# Patient Record
Sex: MALE | Race: OTHER | URBAN - METROPOLITAN AREA
[De-identification: names, ages, dates, MRNs, and addresses within clinical notes are randomized per-mention and may not be internally consistent; named-entity substitution may affect disease eponyms.]

---

## 2024-12-07 ENCOUNTER — INPATIENT (INPATIENT)
Facility: HOSPITAL | Age: 64
LOS: 8 days | Discharge: NURSING, REHAB OR LONG-TERM CARE | DRG: 897 | End: 2024-12-16
Attending: STUDENT IN AN ORGANIZED HEALTH CARE EDUCATION/TRAINING PROGRAM | Admitting: STUDENT IN AN ORGANIZED HEALTH CARE EDUCATION/TRAINING PROGRAM
Payer: COMMERCIAL

## 2024-12-07 ENCOUNTER — EMERGENCY (EMERGENCY)
Facility: HOSPITAL | Age: 64
LOS: 0 days | Discharge: ANOTHER HOSPITAL | End: 2024-12-07
Attending: STUDENT IN AN ORGANIZED HEALTH CARE EDUCATION/TRAINING PROGRAM
Payer: MEDICARE

## 2024-12-07 VITALS
HEIGHT: 69 IN | DIASTOLIC BLOOD PRESSURE: 98 MMHG | SYSTOLIC BLOOD PRESSURE: 157 MMHG | TEMPERATURE: 98 F | WEIGHT: 169.98 LBS | OXYGEN SATURATION: 97 % | RESPIRATION RATE: 19 BRPM | HEART RATE: 117 BPM

## 2024-12-07 VITALS
TEMPERATURE: 98 F | HEART RATE: 122 BPM | DIASTOLIC BLOOD PRESSURE: 102 MMHG | HEIGHT: 68 IN | RESPIRATION RATE: 22 BRPM | WEIGHT: 169.98 LBS | OXYGEN SATURATION: 99 % | SYSTOLIC BLOOD PRESSURE: 146 MMHG

## 2024-12-07 VITALS
RESPIRATION RATE: 16 BRPM | DIASTOLIC BLOOD PRESSURE: 109 MMHG | HEART RATE: 110 BPM | OXYGEN SATURATION: 96 % | TEMPERATURE: 98 F | SYSTOLIC BLOOD PRESSURE: 165 MMHG

## 2024-12-07 DIAGNOSIS — R53.1 WEAKNESS: ICD-10-CM

## 2024-12-07 DIAGNOSIS — Z95.1 PRESENCE OF AORTOCORONARY BYPASS GRAFT: ICD-10-CM

## 2024-12-07 DIAGNOSIS — E78.5 HYPERLIPIDEMIA, UNSPECIFIED: ICD-10-CM

## 2024-12-07 DIAGNOSIS — Z95.1 PRESENCE OF AORTOCORONARY BYPASS GRAFT: Chronic | ICD-10-CM

## 2024-12-07 DIAGNOSIS — S70.12XA CONTUSION OF LEFT THIGH, INITIAL ENCOUNTER: ICD-10-CM

## 2024-12-07 DIAGNOSIS — M54.2 CERVICALGIA: ICD-10-CM

## 2024-12-07 DIAGNOSIS — S30.1XXA CONTUSION OF ABDOMINAL WALL, INITIAL ENCOUNTER: ICD-10-CM

## 2024-12-07 DIAGNOSIS — I10 ESSENTIAL (PRIMARY) HYPERTENSION: ICD-10-CM

## 2024-12-07 DIAGNOSIS — F10.939 ALCOHOL USE, UNSPECIFIED WITH WITHDRAWAL, UNSPECIFIED: ICD-10-CM

## 2024-12-07 DIAGNOSIS — R00.0 TACHYCARDIA, UNSPECIFIED: ICD-10-CM

## 2024-12-07 DIAGNOSIS — R07.9 CHEST PAIN, UNSPECIFIED: ICD-10-CM

## 2024-12-07 DIAGNOSIS — R51.9 HEADACHE, UNSPECIFIED: ICD-10-CM
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
## 2024-12-07 DIAGNOSIS — I25.10 ATHEROSCLEROTIC HEART DISEASE OF NATIVE CORONARY ARTERY WITHOUT ANGINA PECTORIS: ICD-10-CM
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
## 2024-12-07 DIAGNOSIS — E11.9 TYPE 2 DIABETES MELLITUS WITHOUT COMPLICATIONS: ICD-10-CM

## 2024-12-07 LAB
ALBUMIN SERPL ELPH-MCNC: 3.1 G/DL — LOW (ref 3.3–5)
ALBUMIN SERPL ELPH-MCNC: 3.6 G/DL — SIGNIFICANT CHANGE UP (ref 3.3–5)
ALP SERPL-CCNC: 54 U/L — SIGNIFICANT CHANGE UP (ref 40–120)
ALP SERPL-CCNC: 54 U/L — SIGNIFICANT CHANGE UP (ref 40–120)
ALT FLD-CCNC: 30 U/L — SIGNIFICANT CHANGE UP (ref 10–45)
ALT FLD-CCNC: 42 U/L — SIGNIFICANT CHANGE UP (ref 12–78)
ANION GAP SERPL CALC-SCNC: 13 MMOL/L — SIGNIFICANT CHANGE UP (ref 5–17)
ANION GAP SERPL CALC-SCNC: 22 MMOL/L — HIGH (ref 5–17)
APPEARANCE UR: CLEAR — SIGNIFICANT CHANGE UP
APTT BLD: 30.7 SEC — SIGNIFICANT CHANGE UP (ref 24.5–35.6)
APTT BLD: 38.3 SEC — HIGH (ref 24.5–35.6)
AST SERPL-CCNC: 107 U/L — HIGH (ref 15–37)
AST SERPL-CCNC: 69 U/L — HIGH (ref 10–40)
BACTERIA # UR AUTO: NEGATIVE /HPF — SIGNIFICANT CHANGE UP
BASOPHILS # BLD AUTO: 0.03 K/UL — SIGNIFICANT CHANGE UP (ref 0–0.2)
BASOPHILS # BLD AUTO: 0.04 K/UL — SIGNIFICANT CHANGE UP (ref 0–0.2)
BASOPHILS NFR BLD AUTO: 0.7 % — SIGNIFICANT CHANGE UP (ref 0–2)
BASOPHILS NFR BLD AUTO: 0.8 % — SIGNIFICANT CHANGE UP (ref 0–2)
BILIRUB SERPL-MCNC: 0.9 MG/DL — SIGNIFICANT CHANGE UP (ref 0.2–1.2)
BILIRUB SERPL-MCNC: 1.1 MG/DL — SIGNIFICANT CHANGE UP (ref 0.2–1.2)
BILIRUB UR-MCNC: NEGATIVE — SIGNIFICANT CHANGE UP
BLD GP AB SCN SERPL QL: NEGATIVE — SIGNIFICANT CHANGE UP
BUN SERPL-MCNC: 8 MG/DL — SIGNIFICANT CHANGE UP (ref 7–23)
BUN SERPL-MCNC: 9 MG/DL — SIGNIFICANT CHANGE UP (ref 7–23)
CALCIUM SERPL-MCNC: 8.4 MG/DL — SIGNIFICANT CHANGE UP (ref 8.4–10.5)
CALCIUM SERPL-MCNC: 8.7 MG/DL — SIGNIFICANT CHANGE UP (ref 8.5–10.1)
CAST: 1 /LPF — SIGNIFICANT CHANGE UP (ref 0–4)
CHLORIDE SERPL-SCNC: 94 MMOL/L — LOW (ref 96–108)
CHLORIDE SERPL-SCNC: 97 MMOL/L — SIGNIFICANT CHANGE UP (ref 96–108)
CO2 SERPL-SCNC: 19 MMOL/L — LOW (ref 22–31)
CO2 SERPL-SCNC: 25 MMOL/L — SIGNIFICANT CHANGE UP (ref 22–31)
COLOR SPEC: YELLOW — SIGNIFICANT CHANGE UP
CREAT SERPL-MCNC: 0.73 MG/DL — SIGNIFICANT CHANGE UP (ref 0.5–1.3)
CREAT SERPL-MCNC: 0.91 MG/DL — SIGNIFICANT CHANGE UP (ref 0.5–1.3)
DIFF PNL FLD: ABNORMAL
EGFR: 102 ML/MIN/1.73M2 — SIGNIFICANT CHANGE UP
EGFR: 94 ML/MIN/1.73M2 — SIGNIFICANT CHANGE UP
EGFR: 94 ML/MIN/1.73M2 — SIGNIFICANT CHANGE UP
EOSINOPHIL # BLD AUTO: 0.02 K/UL — SIGNIFICANT CHANGE UP (ref 0–0.5)
EOSINOPHIL # BLD AUTO: 0.03 K/UL — SIGNIFICANT CHANGE UP (ref 0–0.5)
EOSINOPHIL NFR BLD AUTO: 0.4 % — SIGNIFICANT CHANGE UP (ref 0–6)
EOSINOPHIL NFR BLD AUTO: 0.6 % — SIGNIFICANT CHANGE UP (ref 0–6)
ETHANOL SERPL-MCNC: 78 MG/DL — HIGH (ref 0–10)
ETHANOL SERPL-MCNC: <10 MG/DL — SIGNIFICANT CHANGE UP (ref 0–10)
GAS PNL BLDV: SIGNIFICANT CHANGE UP
GLUCOSE SERPL-MCNC: 159 MG/DL — HIGH (ref 70–99)
GLUCOSE SERPL-MCNC: 92 MG/DL — SIGNIFICANT CHANGE UP (ref 70–99)
GLUCOSE UR QL: >=1000 MG/DL
HCT VFR BLD CALC: 39.3 % — SIGNIFICANT CHANGE UP (ref 39–50)
HCT VFR BLD CALC: 41.7 % — SIGNIFICANT CHANGE UP (ref 39–50)
HGB BLD-MCNC: 13.2 G/DL — SIGNIFICANT CHANGE UP (ref 13–17)
HGB BLD-MCNC: 14.1 G/DL — SIGNIFICANT CHANGE UP (ref 13–17)
IMM GRANULOCYTES NFR BLD AUTO: 0.4 % — SIGNIFICANT CHANGE UP (ref 0–0.9)
IMM GRANULOCYTES NFR BLD AUTO: 0.8 % — SIGNIFICANT CHANGE UP (ref 0–0.9)
INR BLD: 0.99 RATIO — SIGNIFICANT CHANGE UP (ref 0.85–1.16)
INR BLD: 1.04 RATIO — SIGNIFICANT CHANGE UP (ref 0.85–1.16)
KETONES UR-MCNC: 40 MG/DL
LACTATE SERPL-SCNC: 2.3 MMOL/L — HIGH (ref 0.7–2)
LEUKOCYTE ESTERASE UR-ACNC: NEGATIVE — SIGNIFICANT CHANGE UP
LIDOCAIN IGE QN: 28 U/L — SIGNIFICANT CHANGE UP (ref 7–60)
LIDOCAIN IGE QN: 35 U/L — SIGNIFICANT CHANGE UP (ref 13–75)
LYMPHOCYTES # BLD AUTO: 0.79 K/UL — LOW (ref 1–3.3)
LYMPHOCYTES # BLD AUTO: 1.02 K/UL — SIGNIFICANT CHANGE UP (ref 1–3.3)
LYMPHOCYTES # BLD AUTO: 15.8 % — SIGNIFICANT CHANGE UP (ref 13–44)
LYMPHOCYTES # BLD AUTO: 22.6 % — SIGNIFICANT CHANGE UP (ref 13–44)
MCHC RBC-ENTMCNC: 31 PG — SIGNIFICANT CHANGE UP (ref 27–34)
MCHC RBC-ENTMCNC: 31.4 PG — SIGNIFICANT CHANGE UP (ref 27–34)
MCHC RBC-ENTMCNC: 33.6 G/DL — SIGNIFICANT CHANGE UP (ref 32–36)
MCHC RBC-ENTMCNC: 33.8 G/DL — SIGNIFICANT CHANGE UP (ref 32–36)
MCV RBC AUTO: 91.6 FL — SIGNIFICANT CHANGE UP (ref 80–100)
MCV RBC AUTO: 93.6 FL — SIGNIFICANT CHANGE UP (ref 80–100)
MONOCYTES # BLD AUTO: 0.28 K/UL — SIGNIFICANT CHANGE UP (ref 0–0.9)
MONOCYTES # BLD AUTO: 0.41 K/UL — SIGNIFICANT CHANGE UP (ref 0–0.9)
MONOCYTES NFR BLD AUTO: 5.6 % — SIGNIFICANT CHANGE UP (ref 2–14)
MONOCYTES NFR BLD AUTO: 9.1 % — SIGNIFICANT CHANGE UP (ref 2–14)
NEUTROPHILS # BLD AUTO: 3.02 K/UL — SIGNIFICANT CHANGE UP (ref 1.8–7.4)
NEUTROPHILS # BLD AUTO: 3.83 K/UL — SIGNIFICANT CHANGE UP (ref 1.8–7.4)
NEUTROPHILS NFR BLD AUTO: 66.8 % — SIGNIFICANT CHANGE UP (ref 43–77)
NEUTROPHILS NFR BLD AUTO: 76.4 % — SIGNIFICANT CHANGE UP (ref 43–77)
NITRITE UR-MCNC: NEGATIVE — SIGNIFICANT CHANGE UP
NRBC # BLD: 0 /100 WBCS — SIGNIFICANT CHANGE UP (ref 0–0)
NRBC # BLD: 0 /100 WBCS — SIGNIFICANT CHANGE UP (ref 0–0)
NRBC BLD-RTO: 0 /100 WBCS — SIGNIFICANT CHANGE UP (ref 0–0)
PCP SPEC-MCNC: SIGNIFICANT CHANGE UP
PH UR: 7 — SIGNIFICANT CHANGE UP (ref 5–8)
PLATELET # BLD AUTO: 90 K/UL — LOW (ref 150–400)
PLATELET # BLD AUTO: 99 K/UL — LOW (ref 150–400)
POTASSIUM SERPL-MCNC: 3.6 MMOL/L — SIGNIFICANT CHANGE UP (ref 3.5–5.3)
POTASSIUM SERPL-MCNC: 4 MMOL/L — SIGNIFICANT CHANGE UP (ref 3.5–5.3)
POTASSIUM SERPL-SCNC: 3.6 MMOL/L — SIGNIFICANT CHANGE UP (ref 3.5–5.3)
POTASSIUM SERPL-SCNC: 4 MMOL/L — SIGNIFICANT CHANGE UP (ref 3.5–5.3)
PROT SERPL-MCNC: 7.1 G/DL — SIGNIFICANT CHANGE UP (ref 6–8.3)
PROT SERPL-MCNC: 8 GM/DL — SIGNIFICANT CHANGE UP (ref 6–8.3)
PROT UR-MCNC: 30 MG/DL
PROTHROM AB SERPL-ACNC: 11.5 SEC — SIGNIFICANT CHANGE UP (ref 9.9–13.4)
PROTHROM AB SERPL-ACNC: 11.9 SEC — SIGNIFICANT CHANGE UP (ref 9.9–13.4)
RBC # BLD: 4.2 M/UL — SIGNIFICANT CHANGE UP (ref 4.2–5.8)
RBC # BLD: 4.55 M/UL — SIGNIFICANT CHANGE UP (ref 4.2–5.8)
RBC # FLD: 15.8 % — HIGH (ref 10.3–14.5)
RBC # FLD: 15.8 % — HIGH (ref 10.3–14.5)
RBC CASTS # UR COMP ASSIST: 31 /HPF — HIGH (ref 0–4)
RH IG SCN BLD-IMP: POSITIVE — SIGNIFICANT CHANGE UP
SODIUM SERPL-SCNC: 135 MMOL/L — SIGNIFICANT CHANGE UP (ref 135–145)
SODIUM SERPL-SCNC: 135 MMOL/L — SIGNIFICANT CHANGE UP (ref 135–145)
SP GR SPEC: >1.03 — HIGH (ref 1–1.03)
SQUAMOUS # UR AUTO: 0 /HPF — SIGNIFICANT CHANGE UP (ref 0–5)
TROPONIN I, HIGH SENSITIVITY RESULT: 8.3 NG/L — SIGNIFICANT CHANGE UP
UROBILINOGEN FLD QL: 2 MG/DL (ref 0.2–1)
WBC # BLD: 4.52 K/UL — SIGNIFICANT CHANGE UP (ref 3.8–10.5)
WBC # BLD: 5.01 K/UL — SIGNIFICANT CHANGE UP (ref 3.8–10.5)
WBC # FLD AUTO: 4.52 K/UL — SIGNIFICANT CHANGE UP (ref 3.8–10.5)
WBC # FLD AUTO: 5.01 K/UL — SIGNIFICANT CHANGE UP (ref 3.8–10.5)
WBC UR QL: 0 /HPF — SIGNIFICANT CHANGE UP (ref 0–5)

## 2024-12-07 PROCEDURE — 93010 ELECTROCARDIOGRAM REPORT: CPT

## 2024-12-07 PROCEDURE — 70551 MRI BRAIN STEM W/O DYE: CPT | Mod: 26,MC

## 2024-12-07 PROCEDURE — 72146 MRI CHEST SPINE W/O DYE: CPT | Mod: 26

## 2024-12-07 PROCEDURE — 99284 EMERGENCY DEPT VISIT MOD MDM: CPT | Mod: GC

## 2024-12-07 PROCEDURE — 72141 MRI NECK SPINE W/O DYE: CPT | Mod: 26

## 2024-12-07 PROCEDURE — 73590 X-RAY EXAM OF LOWER LEG: CPT | Mod: 26,LT

## 2024-12-07 PROCEDURE — 99285 EMERGENCY DEPT VISIT HI MDM: CPT

## 2024-12-07 PROCEDURE — 73552 X-RAY EXAM OF FEMUR 2/>: CPT | Mod: 26,LT

## 2024-12-07 PROCEDURE — 72148 MRI LUMBAR SPINE W/O DYE: CPT | Mod: 26

## 2024-12-07 PROCEDURE — 70450 CT HEAD/BRAIN W/O DYE: CPT | Mod: 26,MC

## 2024-12-07 PROCEDURE — 74177 CT ABD & PELVIS W/CONTRAST: CPT | Mod: 26,MC

## 2024-12-07 PROCEDURE — 73610 X-RAY EXAM OF ANKLE: CPT | Mod: 26,LT

## 2024-12-07 PROCEDURE — 71260 CT THORAX DX C+: CPT | Mod: 26,MC

## 2024-12-07 PROCEDURE — 72131 CT LUMBAR SPINE W/O DYE: CPT | Mod: 26,MC

## 2024-12-07 PROCEDURE — 73564 X-RAY EXAM KNEE 4 OR MORE: CPT | Mod: 26,LT

## 2024-12-07 PROCEDURE — 71045 X-RAY EXAM CHEST 1 VIEW: CPT | Mod: 26

## 2024-12-07 PROCEDURE — 72128 CT CHEST SPINE W/O DYE: CPT | Mod: 26,MC

## 2024-12-07 PROCEDURE — 72125 CT NECK SPINE W/O DYE: CPT | Mod: 26,MC

## 2024-12-07 PROCEDURE — 73630 X-RAY EXAM OF FOOT: CPT | Mod: 26,LT

## 2024-12-07 PROCEDURE — 99291 CRITICAL CARE FIRST HOUR: CPT

## 2024-12-07 RX ORDER — LANOLIN ALCOHOL/MO/W.PET/CERES
500 CREAM (GRAM) TOPICAL ONCE
Refills: 0 | Status: COMPLETED | OUTPATIENT
Start: 2024-12-07 | End: 2024-12-07

## 2024-12-07 RX ORDER — ACETAMINOPHEN 500 MG/5ML
1000 LIQUID (ML) ORAL ONCE
Refills: 0 | Status: COMPLETED | OUTPATIENT
Start: 2024-12-07 | End: 2024-12-07

## 2024-12-07 RX ORDER — DIAZEPAM 10 MG/1
5 TABLET ORAL ONCE
Refills: 0 | Status: DISCONTINUED | OUTPATIENT
Start: 2024-12-07 | End: 2024-12-07

## 2024-12-07 RX ORDER — DEXAMETHASONE 0.5 MG/1
6 TABLET ORAL ONCE
Refills: 0 | Status: COMPLETED | OUTPATIENT
Start: 2024-12-07 | End: 2024-12-07

## 2024-12-07 RX ORDER — DIAZEPAM 10 MG/1
10 TABLET ORAL ONCE
Refills: 0 | Status: DISCONTINUED | OUTPATIENT
Start: 2024-12-07 | End: 2024-12-07

## 2024-12-07 RX ADMIN — Medication 105 MILLIGRAM(S): at 20:42

## 2024-12-07 RX ADMIN — DEXAMETHASONE 6 MILLIGRAM(S): 0.5 TABLET ORAL at 15:45

## 2024-12-07 RX ADMIN — DIAZEPAM 5 MILLIGRAM(S): 10 TABLET ORAL at 18:05

## 2024-12-07 RX ADMIN — Medication 1000 MILLIGRAM(S): at 14:13

## 2024-12-07 RX ADMIN — DIAZEPAM 10 MILLIGRAM(S): 10 TABLET ORAL at 22:54

## 2024-12-07 RX ADMIN — Medication 4 MILLIGRAM(S): at 14:13

## 2024-12-07 RX ADMIN — Medication 2 SPRAY(S): at 20:42

## 2024-12-07 RX ADMIN — Medication 4 MILLIGRAM(S): at 13:52

## 2024-12-07 RX ADMIN — Medication 1000 MILLILITER(S): at 16:32

## 2024-12-07 RX ADMIN — Medication 400 MILLIGRAM(S): at 13:52

## 2024-12-07 RX ADMIN — Medication 1000 MILLILITER(S): at 14:54

## 2024-12-07 RX ADMIN — Medication 1000 MILLIGRAM(S): at 16:13

## 2024-12-07 NOTE — ED ADULT NURSE NOTE - OBJECTIVE STATEMENT
patient is a 65 y/o M with hx of HTN transfer from [facility] as trauma transfer. patient states that he had mechanical fall yesterday on concrete steps after he lost footing with + head strike to back of head, denies LOC. patient states that he presented to [facility] this morning d/t worsening left sided weakness and pain. patient c/o left sided numbness/weakness, pain to the side of left neck, back of head, abd, chest, and b/l knees. patient also endorsing blurred vision to left eye. c-collar in place on arrival. MD Betancourt at bedside to assess as well as trauma surgery. patient tachycardic to 120s on arrival, placed on CM. a&ox4, PERRL. respirations even and unlabored. abdomen soft, nondistended. skin intact. placed in position of comfort. updated on plan. safety maintained

## 2024-12-07 NOTE — CONSULT NOTE ADULT - ASSESSMENT
ASSESSMENT & PLAN  64yMale w/ MR cord compression suggesting suspicion for C7 and T1 mild VCFs. No cord compression on prelim read.    Plan:  -c/w C-collar at all times  -WBAT  -pain control  -incentive spirometry  -PT/OT  -no acute ortho spine surgery at this time  -f/u outpt with Dr. Chopra in 1 week, please call office for appt    Constance Brennan, PGY-2  Orthopedic Surgery    [facility] [place]: [id number]  [facility]: [id number]  [facility]:  [id number]/ [phone]

## 2024-12-07 NOTE — ED ADULT NURSE NOTE - CHIEF COMPLAINT QUOTE
trauma transfer from [facility] for [facility] trip and fall +head strike, +LOC  persistent numbness to left side & c-spine tenderness (c-collar in place)

## 2024-12-07 NOTE — ED ADULT NURSE NOTE - AVIAN FLU SYMPTOMS
[FreeTextEntry1] : Wounds look fine thus far.\par Having BM's ok as well\par On stool regimen which she will continue.\par Off pain meds and is happy.\par Continue present care. \par RTC 6 weeks 
No

## 2024-12-07 NOTE — ED PROVIDER NOTE - OBJECTIVE STATEMENT
64-year-old male past medical history of diabetes, CAD status post cardiac bypass, hypertension, hyperlipidemia presenting as a transfer from [facility].  Patient presented there due to last night around 1800 he was going down to the basement stairs slipped and fell due to the slippers he was wearing.  Approximately fell down 6 steps.  Patient was evaluated at [facility] with CT imaging without any obvious traumatic injury.  Transferred for concern for spinal cord pathology due to left lower extremity numbness and weakness.  Denies any saddle anesthesia, no urinary bladder issues.

## 2024-12-07 NOTE — ED PROVIDER NOTE - ATTENDING CONTRIBUTION TO CARE
Emergency Medicine Attending MD Betancourt:  patient seen and evaluated with the resident.  I was present for key portions of the History & Physical, and I agree with the Impression & Plan.    Patient is a 64-year-old male, brought in by EMS, transferred from [facility] for evaluation of possible cervical spine injury.  Patient suffered a mechanical fall last night at 6 PM, with LOC, prolonged downtime.  Called 911 this morning on account of whole body pain, including weakness of the left upper extremity and left lower extremities.    ETOH level 90 at OSH.  Patien denies ETOH consumption.     Pan CT scan revealed no acute intracranial, osseous, intrathoracic or intra-abdominal injuries.  CT C-spine showed no fractures.    VS: HR 110s  Gen: Elderly male, c-collar in place, mild distress.  Head: NC/AT, + tongue fasciculations appreciated  Neck: trachea midline  Resp:  No distress  CV: RRR, no RMG  Abd: nondistended  Ext: no deformities  Neuro:  A&Ox4  Right upper extremity and right lower extremity 5 out of 5, left upper extremity 4 out of 5, left lower extremity 4 out of 5.  + Hyperreflexic in the left upper and left lower extremity.  Skin:  Warm and dry as visualized  Psych: appropriate    Medical Decision Making / Differential Diagnosis:  HPI concerning for cord injury given neurologic symptoms, possible ligamentous injury..    Although patient denies significant alcohol consumption, he has symptoms consistent with sedative-hypnotic withdrawal: Tachycardia, tongue fasciculations, bilateral hand tremor.  As such we will administer 5 mg diazepam and clinically reassess.  If he has a significant positive response to IV diazepam, will likely placed on a CIWA protocol.    ECG directly visualized by me and shows sinus tachycardia, rate 102, , QRS 86, QTc 463, no ST elevations or depressions

## 2024-12-07 NOTE — ED ADULT NURSE NOTE - NSFALLRISKINTERV_ED_ALL_ED

## 2024-12-07 NOTE — ED ADULT TRIAGE NOTE - CHIEF COMPLAINT QUOTE
trauma transfer from [facility] for [facility] trip and fall +head strike, +LOC  persistent numbness to left side & c-spine tenderness (c-collar in place)

## 2024-12-07 NOTE — ED ADULT TRIAGE NOTE - ARRIVAL FROM
Hospitals/Psychiatric Facilities A-T Advancement Flap Text: The defect edges were debeveled with a #15 scalpel blade.  Given the location of the defect, shape of the defect and the proximity to free margins an A-T advancement flap was deemed most appropriate.  Using a sterile surgical marker, an appropriate advancement flap was drawn incorporating the defect and placing the expected incisions within the relaxed skin tension lines where possible.    The area thus outlined was incised deep to adipose tissue with a #15 scalpel blade.  The skin margins were undermined to an appropriate distance in all directions utilizing iris scissors.

## 2024-12-07 NOTE — ED ADULT NURSE NOTE - NS ED NURSE LEVEL OF CONSCIOUSNESS MENTAL STATUS
Awake/Alert/Cooperative
H&P reviewed and pt seen. Management plan discussed with resident and parent.

## 2024-12-07 NOTE — CONSULT NOTE ADULT - ATTENDING COMMENTS
Patient examined by me in ED. 64 year old man s/p fall while intoxicated. On my evaluation patient actively withdrawing from alcohol with tachycardia and significant tremors. He complains of unequal vision and decreased sensation in the face and LUE. He also complains of LUE and LLE weakness. On exam sensation is intact. He is able to move all extremities antigravity but strength exam limited due to tremors. No focal deficits. CTH, C-Spine, Chest Abdomen Pelvis performed at OSH with no evidence of traumatic injury. MRI Brain shows two punctate chronic microhemorrhages in the right frontal lobe and a chronic microhemorrhage in the left. MRI C-spine shows mild edema in the C7 and T1 vertebral bodies, suspicious for acute mild compression fractures. No evidence of cord compression. Patient evaluated by ortho-spine, recommend maintaining collar and outpatient follow up.     A/P: Patient being admitted to medicine for EtOH withdrawal and further neurologic workup in the setting of multiple intraparenchymal microhemorrhages. Trauma to follow and perform tertiary survey. F/up CTA head and neck. Nostril Rim Text: The closure involved the nostril rim.

## 2024-12-07 NOTE — CONSULT NOTE ADULT - SUBJECTIVE AND OBJECTIVE BOX
TRAUMA SERVICE (Acute Care Surgery / [facility] - #9039) - CONSULT NOTE  --------------------------------------------------------------------------------------------    TRAUMA ACTIVATION LEVEL: consult    MECHANISM OF INJURY:      [] Blunt  	[] MVC	[X] Fall	[] Pedestrian Struck	[] Motorcycle accident      [] Penetrating  	[] Gun Shot Wound 		[] Stab Wound    GCS: 	E: 4	V: 5	M: 6      HPI:   64M hx DM, HTN, HLD, CAD s/p CABG '20 on ASA, tx from [facility] VS for trauma surgery evaluation following mechanical fall. Patient experienced mechanical fall yesterday in the afternoon, falling backward down six steps. Reports HS and LOC. Presented to OSH this morning because he was unable to get out of bed. He has not tried ambulating yet. Reports 8/10 pain in entire left side of body, from top of head to bottom of left foot. Describes pain as an ache/burning. Reports subjective coldness to left face and left hand. Also reports some numbness in left hand. Complaining of blurry vision in left eye. Reports one episode of emesis earlier today. Denies changes to hearing, smell, or taste.    Pan scan at OSH negative for acute injury. At [facility] ED, tachy to 120s. OSH labs grossly normal. Patient with elevated blood alcohol level.    Primary Survey:   A - airway intact  B - bilateral breath sounds and good chest rise  C - initial BP: 161/109 (12-07-24 @ 18:10) , HR: 120 (12-07-24 @ 18:10) , palpable pulses in all extremities  D - GCS 15 on arrival  Exposure obtained      Secondary Survey:   General: NAD  HEENT: Normocephalic, atraumatic, EOMI, PERRLA. Cervical collar in place. Left side of head and face tender without evidence of trauma  Neck: Soft, midline trachea, C-collar in place. No c-spine tenderness  Chest: L side chest wall tenderness  Cardiac: tachy to 120s. Well-healed sternotomy scar  Respiratory: nonlabored respirations. L chest wall tender without evidence of trauma  Abdomen: Soft, non-distended, left hemiabdominal tenderness, no rebound, no guarding, no masses palpated  Pelvis: Stable, left sided tenderness, no ecchymosis  Ext: palp radial b/l UE, b/l DP palp in Lower Extrem, motor and sensory grossly intact in all 4 extremities. Tenderness in upper and lower extremities on left, without evidence of trauma  Back: no palpable runoff/stepoff/deformity, left sided back tenderness    ROS: 10-system review is otherwise negative except HPI above.      PAST MEDICAL & SURGICAL HISTORY:    FAMILY HISTORY:    [] Family history not pertinent as reviewed with the patient and family    SOCIAL HISTORY:      Reports drinking half a pint of alcohol every week    ALLERGIES: No Known Allergies      HOME MEDICATIONS:   ASA    CURRENT MEDICATIONS  MEDICATIONS (STANDING): thiamine IVPB 500 milliGRAM(s) IV Intermittent Once    MEDICATIONS (PRN):  --------------------------------------------------------------------------------------------    Vitals:   T(C): 36.8 (12-07-24 @ 17:18), Max: 36.8 (12-07-24 @ 17:18)  HR: 120 (12-07-24 @ 18:10) (117 - 120)  BP: 161/109 (12-07-24 @ 18:10) (157/98 - 161/109)  RR: 20 (12-07-24 @ 18:10) (19 - 20)  SpO2: 95% (12-07-24 @ 18:10) (95% - 97%)  CAPILLARY BLOOD GLUCOSE        CAPILLARY BLOOD GLUCOSE          Height (cm): 175.3 (12-07 @ 17:18)  Weight (kg): 77.1 (12-07 @ 17:18)  BMI (kg/m2): 25.1 (12-07 @ 17:18)  BSA (m2): 1.93 (12-07 @ 17:18)  --------------------------------------------------------------------------------------------    LABS  CBC (12-07 @ 17:53)                              13.2                           5.01    )----------------(  90[L]      76.4  % Neutrophils, 15.8  % Lymphocytes, ANC: 3.83                                39.3          Coags (12-07 @ 17:53)  aPTT 30.7 / INR 1.04 / PT 11.9          --------------------------------------------------------------------------------------------    MICROBIOLOGY      --------------------------------------------------------------------------------------------    IMAGING  OSH imaging without evidence of acute traumatic injury   TRAUMA SERVICE (Acute Care Surgery / [facility] - #9039) - CONSULT NOTE  --------------------------------------------------------------------------------------------    TRAUMA ACTIVATION LEVEL: consult    MECHANISM OF INJURY:      [] Blunt  	[] MVC	[X] Fall	[] Pedestrian Struck	[] Motorcycle accident      [] Penetrating  	[] Gun Shot Wound 		[] Stab Wound    GCS: 	E: 4	V: 5	M: 6      HPI:   64M hx DM, HTN, HLD, CAD s/p CABG '20 on ASA, tx from [facility] VS for trauma surgery evaluation following fall. Patient experienced fall yesterday in the afternoon, falling backward down six steps. Reports HS and LOC. Presented to OSH this morning because he was unable to get out of bed. He has not tried ambulating yet. Reports 8/10 pain in entire left side of body, from top of head to bottom of left foot. Describes pain as an ache/burning. Reports subjective coldness to left face and left hand. Also reports some numbness in left hand. Complaining of blurry vision in left eye. Reports one episode of emesis earlier today. Denies changes to hearing, smell, or taste.    Pan scan at OSH negative for acute injury. At [facility] ED, tachy to 120s. OSH labs grossly normal. Patient with elevated blood alcohol level.    Primary Survey:   A - airway intact  B - bilateral breath sounds and good chest rise  C - initial BP: 161/109 (12-07-24 @ 18:10) , HR: 120 (12-07-24 @ 18:10) , palpable pulses in all extremities  D - GCS 15 on arrival  Exposure obtained      Secondary Survey:   General: NAD  HEENT: Normocephalic, atraumatic, EOMI, PERRLA. Cervical collar in place. Left side of head and face tender without evidence of trauma  Neck: Soft, midline trachea, C-collar in place. No c-spine tenderness  Chest: L side chest wall tenderness  Cardiac: tachy to 120s. Well-healed sternotomy scar  Respiratory: nonlabored respirations. L chest wall tender without evidence of trauma  Abdomen: Soft, non-distended, left hemiabdominal tenderness, no rebound, no guarding, no masses palpated  Pelvis: Stable, left sided tenderness, no ecchymosis  Ext: palp radial b/l UE, b/l DP palp in Lower Extrem, motor and sensory grossly intact in all 4 extremities. Tenderness in upper and lower extremities on left, without evidence of trauma  Back: no palpable runoff/stepoff/deformity, left sided back tenderness    ROS: 10-system review is otherwise negative except HPI above.      PAST MEDICAL & SURGICAL HISTORY:    FAMILY HISTORY:    [] Family history not pertinent as reviewed with the patient and family    SOCIAL HISTORY:      Reports drinking half a pint of alcohol every week    ALLERGIES: No Known Allergies      HOME MEDICATIONS:   ASA    CURRENT MEDICATIONS  MEDICATIONS (STANDING): thiamine IVPB 500 milliGRAM(s) IV Intermittent Once    MEDICATIONS (PRN):  --------------------------------------------------------------------------------------------    Vitals:   T(C): 36.8 (12-07-24 @ 17:18), Max: 36.8 (12-07-24 @ 17:18)  HR: 120 (12-07-24 @ 18:10) (117 - 120)  BP: 161/109 (12-07-24 @ 18:10) (157/98 - 161/109)  RR: 20 (12-07-24 @ 18:10) (19 - 20)  SpO2: 95% (12-07-24 @ 18:10) (95% - 97%)  CAPILLARY BLOOD GLUCOSE        CAPILLARY BLOOD GLUCOSE          Height (cm): 175.3 (12-07 @ 17:18)  Weight (kg): 77.1 (12-07 @ 17:18)  BMI (kg/m2): 25.1 (12-07 @ 17:18)  BSA (m2): 1.93 (12-07 @ 17:18)  --------------------------------------------------------------------------------------------    LABS  CBC (12-07 @ 17:53)                              13.2                           5.01    )----------------(  90[L]      76.4  % Neutrophils, 15.8  % Lymphocytes, ANC: 3.83                                39.3          Coags (12-07 @ 17:53)  aPTT 30.7 / INR 1.04 / PT 11.9          --------------------------------------------------------------------------------------------    MICROBIOLOGY      --------------------------------------------------------------------------------------------    IMAGING  OSH imaging without evidence of acute traumatic injury

## 2024-12-07 NOTE — ED ADULT NURSE REASSESSMENT NOTE - NS ED NURSE REASSESS COMMENT FT1
Report received from SUSAN Salazar. Pt received A&Ox4, vitals retaken and documented. Pt resting in stretcher, in C-collar, received on CM and continuos pulse ox. Bed locked and in lowest position, side rails raised. Currently pending MRIr.

## 2024-12-07 NOTE — CONSULT NOTE ADULT - SUBJECTIVE AND OBJECTIVE BOX
HPI  64yMale c/o neck pain and L groin pain s/p MF yesterday down 5 steps. Denies focal weakness, numbness/tingling, or radicular sxs. Denies fevers/chills, acute changes in bowel/bladder function, or saddle anesthesia. Community ambulator w/o assistive devices at baseline.    ROS  Negative unless otherwise specified in HPI.    PAST MEDICAL & SURGICAL Hx  PAST MEDICAL & SURGICAL HISTORY:      MEDICATIONS  Home Medications:      ALLERGIES  No Known Allergies      FAMILY Hx  FAMILY HISTORY:      SOCIAL Hx  Social History:      VITALS  Vital Signs Last 24 Hrs  T(C): 37.1 (07 Dec 2024 20:54), Max: 37.1 (07 Dec 2024 20:54)  T(F): 98.8 (07 Dec 2024 20:54), Max: 98.8 (07 Dec 2024 20:54)  HR: 96 (07 Dec 2024 20:54) (96 - 120)  BP: 160/101 (07 Dec 2024 20:54) (157/98 - 161/109)  BP(mean): --  RR: 18 (07 Dec 2024 20:54) (18 - 20)  SpO2: 96% (07 Dec 2024 20:54) (95% - 97%)    Parameters below as of 07 Dec 2024 20:54  Patient On (Oxygen Delivery Method): room air        PHYSICAL EXAM  Gen: Lying in bed, NAD  Resp: No increased WOB  Spine:  Skin intact  Bony ttp over C spine midline; no bony TTP or step-offs along t-, l-spine, or sacrum    Motor:                   C5                C6              C7               C8           T1   R            4+/5             4+/5           4+/5            4+/5       4+/5  L             4+/5             4+/5           4+/5            4+/5       4+/5                L2             L3             L4               L5            S1  R         4+/5             4+/5           4+/5            4+/5       4+/5  L          4+/5             4+/5           4+/5            4+/5       4+/5    Sensory:            C5         C6         C7      C8       T1        (0=absent, 1=impaired, 2=normal, NT=not testable)  R         2            2           2        2         2  L          2            2           2        2         2               L2          L3         L4      L5       S1         (0=absent, 1=impaired, 2=normal, NT=not testable)  R         2            2            2        2        2  L          2            2           2        2         2    (+) Rectal tone, saddle/perianal SILT  (-) Hopkins test b/l  (-) Straight leg raise test b/l  (-) Babinski sign b/l  (-) Ankle clonus b/l      LABS                        13.2   5.01  )-----------( 90       ( 07 Dec 2024 17:53 )             39.3     12-07    135  |  94[L]  |  9   ----------------------------<  159[H]  4.0   |  19[L]  |  0.73    Ca    8.4      07 Dec 2024 17:53      TPro  7.1  /  Alb  3.6  /  TBili  0.9  /  DBili  x   /  AST  69[H]  /  ALT  30  /  AlkPhos  54  12-07    PT/INR - ( 07 Dec 2024 17:53 )   PT: 11.9 sec;   INR: 1.04 ratio         PTT - ( 07 Dec 2024 17:53 )  PTT:30.7 sec    IMAGING    < from: MR Acute Spinal Cord Compression (12.07.24 @ 20:15) >  impression:  Mild edema in the C7 and T1 vertebral bodies may suspicious for acute   mild compression fractures.    Straightening of the cervical lordosis.  Degenerative grade 1   anterolisthesis at C2-C3 and C3-C4.  Degenerative grade 1 retrolisthesis   at C7-T1.  No evidence of ligamentous injury or traumatic malalignment.    No cord compression, cord edema or cord signal abnormality in the   cervical or thoracic spine.    No cauda equina compression.    < end of copied text >    XR LLE: no fx or dislocation

## 2024-12-07 NOTE — ED PROVIDER NOTE - PROGRESS NOTE DETAILS
Davion Alberts DO (PGY3)  Received signout on this patient, Davion Alberts DO (PGY3)  patient evaluated by orthospine, MRI was completed, no signs of cord compression.  Patient has C7-T1 acute compression fractures.  Patient to stay in c-collar. Given 10 more of valium for withdrawal. Utox was added on. Will admit

## 2024-12-08 DIAGNOSIS — Z95.1 PRESENCE OF AORTOCORONARY BYPASS GRAFT: Chronic | ICD-10-CM

## 2024-12-08 DIAGNOSIS — Z90.49 ACQUIRED ABSENCE OF OTHER SPECIFIED PARTS OF DIGESTIVE TRACT: Chronic | ICD-10-CM

## 2024-12-08 DIAGNOSIS — Z79.899 OTHER LONG TERM (CURRENT) DRUG THERAPY: ICD-10-CM

## 2024-12-08 DIAGNOSIS — I10 ESSENTIAL (PRIMARY) HYPERTENSION: ICD-10-CM

## 2024-12-08 DIAGNOSIS — I25.10 ATHEROSCLEROTIC HEART DISEASE OF NATIVE CORONARY ARTERY WITHOUT ANGINA PECTORIS: ICD-10-CM

## 2024-12-08 DIAGNOSIS — F10.10 ALCOHOL ABUSE, UNCOMPLICATED: ICD-10-CM

## 2024-12-08 DIAGNOSIS — E11.9 TYPE 2 DIABETES MELLITUS WITHOUT COMPLICATIONS: ICD-10-CM

## 2024-12-08 DIAGNOSIS — M48.50XA COLLAPSED VERTEBRA, NOT ELSEWHERE CLASSIFIED, SITE UNSPECIFIED, INITIAL ENCOUNTER FOR FRACTURE: ICD-10-CM

## 2024-12-08 DIAGNOSIS — Z29.9 ENCOUNTER FOR PROPHYLACTIC MEASURES, UNSPECIFIED: ICD-10-CM

## 2024-12-08 LAB
A1C WITH ESTIMATED AVERAGE GLUCOSE RESULT: 6.9 % — HIGH (ref 4–5.6)
AMPHET UR-MCNC: NEGATIVE — SIGNIFICANT CHANGE UP
ANION GAP SERPL CALC-SCNC: 18 MMOL/L — HIGH (ref 5–17)
BARBITURATES UR SCN-MCNC: NEGATIVE — SIGNIFICANT CHANGE UP
BENZODIAZ UR-MCNC: NEGATIVE — SIGNIFICANT CHANGE UP
BUN SERPL-MCNC: 12 MG/DL — SIGNIFICANT CHANGE UP (ref 7–23)
CALCIUM SERPL-MCNC: 8.9 MG/DL — SIGNIFICANT CHANGE UP (ref 8.4–10.5)
CHLORIDE SERPL-SCNC: 94 MMOL/L — LOW (ref 96–108)
CO2 SERPL-SCNC: 22 MMOL/L — SIGNIFICANT CHANGE UP (ref 22–31)
COCAINE METAB.OTHER UR-MCNC: NEGATIVE — SIGNIFICANT CHANGE UP
CREAT SERPL-MCNC: 0.69 MG/DL — SIGNIFICANT CHANGE UP (ref 0.5–1.3)
EGFR: 103 ML/MIN/1.73M2 — SIGNIFICANT CHANGE UP
ESTIMATED AVERAGE GLUCOSE: 151 MG/DL — HIGH (ref 68–114)
GLUCOSE BLDC GLUCOMTR-MCNC: 181 MG/DL — HIGH (ref 70–99)
GLUCOSE BLDC GLUCOMTR-MCNC: 183 MG/DL — HIGH (ref 70–99)
GLUCOSE BLDC GLUCOMTR-MCNC: 200 MG/DL — HIGH (ref 70–99)
GLUCOSE BLDC GLUCOMTR-MCNC: 211 MG/DL — HIGH (ref 70–99)
GLUCOSE BLDC GLUCOMTR-MCNC: 214 MG/DL — HIGH (ref 70–99)
GLUCOSE SERPL-MCNC: 227 MG/DL — HIGH (ref 70–99)
MAGNESIUM SERPL-MCNC: 1.8 MG/DL — SIGNIFICANT CHANGE UP (ref 1.6–2.6)
METHADONE UR-MCNC: NEGATIVE — SIGNIFICANT CHANGE UP
OPIATES UR-MCNC: POSITIVE
OXYCODONE UR-MCNC: NEGATIVE — SIGNIFICANT CHANGE UP
PCP UR-MCNC: NEGATIVE — SIGNIFICANT CHANGE UP
PHOSPHATE SERPL-MCNC: 1.9 MG/DL — LOW (ref 2.5–4.5)
POTASSIUM SERPL-MCNC: 4.1 MMOL/L — SIGNIFICANT CHANGE UP (ref 3.5–5.3)
POTASSIUM SERPL-SCNC: 4.1 MMOL/L — SIGNIFICANT CHANGE UP (ref 3.5–5.3)
SODIUM SERPL-SCNC: 134 MMOL/L — LOW (ref 135–145)
THC UR QL: NEGATIVE — SIGNIFICANT CHANGE UP

## 2024-12-08 PROCEDURE — 99223 1ST HOSP IP/OBS HIGH 75: CPT

## 2024-12-08 RX ORDER — LORAZEPAM 2 MG/1
2 TABLET ORAL
Refills: 0 | Status: DISCONTINUED | OUTPATIENT
Start: 2024-12-08 | End: 2024-12-15

## 2024-12-08 RX ORDER — 0.9 % SODIUM CHLORIDE 0.9 %
1000 INTRAVENOUS SOLUTION INTRAVENOUS
Refills: 0 | Status: DISCONTINUED | OUTPATIENT
Start: 2024-12-08 | End: 2024-12-16

## 2024-12-08 RX ORDER — MAGNESIUM, ALUMINUM HYDROXIDE 200-225/5
30 SUSPENSION, ORAL (FINAL DOSE FORM) ORAL ONCE
Refills: 0 | Status: COMPLETED | OUTPATIENT
Start: 2024-12-08 | End: 2024-12-08

## 2024-12-08 RX ORDER — LOSARTAN POTASSIUM 100 MG/1
100 TABLET, FILM COATED ORAL DAILY
Refills: 0 | Status: DISCONTINUED | OUTPATIENT
Start: 2024-12-08 | End: 2024-12-16

## 2024-12-08 RX ORDER — GLUCAGON INJECTION, SOLUTION 0.5 MG/.1ML
1 INJECTION, SOLUTION SUBCUTANEOUS ONCE
Refills: 0 | Status: DISCONTINUED | OUTPATIENT
Start: 2024-12-08 | End: 2024-12-16

## 2024-12-08 RX ORDER — PANTOPRAZOLE SODIUM 40 MG/1
40 TABLET, DELAYED RELEASE ORAL
Refills: 0 | Status: DISCONTINUED | OUTPATIENT
Start: 2024-12-08 | End: 2024-12-16

## 2024-12-08 RX ORDER — CLOPIDOGREL 75 MG/1
75 TABLET, FILM COATED ORAL DAILY
Refills: 0 | Status: DISCONTINUED | OUTPATIENT
Start: 2024-12-08 | End: 2024-12-16

## 2024-12-08 RX ORDER — ENOXAPARIN SODIUM 30 MG/.3ML
40 INJECTION SUBCUTANEOUS EVERY 24 HOURS
Refills: 0 | Status: DISCONTINUED | OUTPATIENT
Start: 2024-12-08 | End: 2024-12-16

## 2024-12-08 RX ORDER — ACETAMINOPHEN 500MG 500 MG/1
975 TABLET, COATED ORAL EVERY 6 HOURS
Refills: 0 | Status: DISCONTINUED | OUTPATIENT
Start: 2024-12-08 | End: 2024-12-16

## 2024-12-08 RX ADMIN — Medication 30 MILLILITER(S): at 16:26

## 2024-12-08 RX ADMIN — PANTOPRAZOLE SODIUM 40 MILLIGRAM(S): 40 TABLET, DELAYED RELEASE ORAL at 06:30

## 2024-12-08 RX ADMIN — LORAZEPAM 2 MILLIGRAM(S): 2 TABLET ORAL at 16:20

## 2024-12-08 RX ADMIN — ACETAMINOPHEN 500MG 975 MILLIGRAM(S): 500 TABLET, COATED ORAL at 17:01

## 2024-12-08 RX ADMIN — LOSARTAN POTASSIUM 100 MILLIGRAM(S): 100 TABLET, FILM COATED ORAL at 05:35

## 2024-12-08 RX ADMIN — CLOPIDOGREL 75 MILLIGRAM(S): 75 TABLET, FILM COATED ORAL at 12:20

## 2024-12-08 RX ADMIN — Medication 2: at 12:19

## 2024-12-08 RX ADMIN — Medication 81 MILLIGRAM(S): at 12:19

## 2024-12-08 RX ADMIN — Medication 2 UNIT(S): at 09:00

## 2024-12-08 RX ADMIN — ACETAMINOPHEN 500MG 975 MILLIGRAM(S): 500 TABLET, COATED ORAL at 16:19

## 2024-12-08 RX ADMIN — Medication 40 MILLIGRAM(S): at 21:37

## 2024-12-08 RX ADMIN — Medication 1: at 17:08

## 2024-12-08 RX ADMIN — ENOXAPARIN SODIUM 40 MILLIGRAM(S): 30 INJECTION SUBCUTANEOUS at 05:35

## 2024-12-08 NOTE — H&P ADULT - PROBLEM SELECTOR PLAN 1
Mechanical fall likely cause of injury. No evidence of cord compression on MRI. No alarm symptoms - no weakness, incontinence  - Maintain cervical collar  - WBAT  - Tylenol prn for pain management  - PT evaluation  - Trauma and ortho recommendations appreciated - no acute surgical intervention Mechanical fall likely cause of injury. No evidence of cord compression on MRI. No alarm symptoms - no weakness, incontinence  - Maintain cervical collar  - WBAT  - Tylenol prn for pain management  - PT evaluation  - Trauma and ortho recommendations appreciated - no acute surgical intervention  - Notified by radiologist that on MRI - noted to have abnormal flow void in the right vertebral artery. Cannot exclude vertebral artery dissection given fall and compression fractures in the area.   - Ordered CTA brain and neck to evaluate for vertebral artery dissection  - Also with other incidental findings of possible 1.2 cm T1 hypointense focus in the superior endplate of T1 with associated T2 signal hyperintensity on STIR images; which may represent Schmorl's node; Recommended for nonemergent CT or MRI lumbar spine for further evaluation.

## 2024-12-08 NOTE — PATIENT PROFILE ADULT - FALL HARM RISK - RISK INTERVENTIONS

## 2024-12-08 NOTE — CHART NOTE - NSCHARTNOTEFT_GEN_A_CORE
Patient seen and examined. Plan as discussed w/ ACP Mounika Mendosa: patient stable and remains in C-collar, encouraged him to limit movement at this time, pending further imaging as ordered to r/o vertebral dissection; appreciate ortho-spine and trauma's evaluations and recommendations; continue close monitoring for pain control; monitor CIWA and continue symptom-triggered Ativan PRN.     Salvatore Reno M.D.  Division of Hospital Medicine  Available on [employer] Teams

## 2024-12-08 NOTE — PHYSICAL THERAPY INITIAL EVALUATION ADULT - NSPTDISCHREC_GEN_A_CORE
if home, pt will need home PT, RW, 3:1 commode and assist/supervision for ALL mobility/transfers/Acute Inpatient Rehab

## 2024-12-08 NOTE — H&P ADULT - ASSESSMENT
64 year old male with hx of T2DM, CAD s/p CABG, HTN, HLD, transferred from [facility] for further evaluation after mechanical fall down stairs with concerns for spinal cord injury. MRI spine here without cord compression but noted to have possible vertebral compression fracture of C7, T1. Pt admitted for further workup and management.

## 2024-12-08 NOTE — CHART NOTE - NSCHARTNOTEFT_GEN_A_CORE
TERTIARY TRAUMA SURVEY (TTS)    Date of TTS:   12/8/24                           Time: 6am  Admit Date:  12/7/24                            Trauma Activation: N/A  Admit GCS: E-4     V- 5    M- 6    HPI:  64 year old male with hx of T2DM, CAD s/p CABG, HTN, HLD, transferred from [facility] for further evaluation after mechanical fall down stairs. The previous night, patient experienced a fall on stairs after slipping. Pt was wearing slippers and was turning around to close the door when he slipped. He fell down about 5-6 steps. He felt he may have passed out but is unsure. When he came to, he was at the bottom of the stairs and felt pain in his head, L arm, and L lateral chest.  Patient was evaluated at [facility] with CT imaging without any obvious traumatic injury.  Transferred for concern for spinal cord pathology due to left lower extremity numbness and weakness. He denies any incontinence, saddle anesthesia. Currently not endorsing any weakness or sensory changes. In the ED, patient was evaluated by trauma surgery and ortho-spine. MRI spine notable for edema in vertebral bodies of C7 and T1 concerning for compression fx. No evidence of spinal cord compression. Pt maintained in C collar. Pt was also noted to be tachycardic and  tremulous raising concern for alcohol withdrawal so was given valium in ED. Pt says he was not drinking around time of fall. BAL normal in ED. Pt admitted for further management      INTERVAL EVENTS:    PAST MEDICAL & SURGICAL HISTORY:  HTN (hypertension)      HLD (hyperlipidemia)      DM (diabetes mellitus)      CAD (coronary artery disease)      S/P CABG (coronary artery bypass graft)      History of appendectomy      CURRENT MEDICATIONS:   Medications (inpatient): aspirin enteric coated 81 milliGRAM(s) Oral daily  atorvastatin 40 milliGRAM(s) Oral at bedtime  clopidogrel Tablet 75 milliGRAM(s) Oral daily  dextrose 5%. 1000 milliLiter(s) IV Continuous <Continuous>  dextrose 5%. 1000 milliLiter(s) IV Continuous <Continuous>  dextrose 50% Injectable 25 Gram(s) IV Push once  dextrose 50% Injectable 12.5 Gram(s) IV Push once  dextrose 50% Injectable 25 Gram(s) IV Push once  enoxaparin Injectable 40 milliGRAM(s) SubCutaneous every 24 hours  glucagon  Injectable 1 milliGRAM(s) IntraMuscular once  insulin lispro (ADMELOG) corrective regimen sliding scale   SubCutaneous three times a day before meals  losartan 100 milliGRAM(s) Oral daily  pantoprazole    Tablet 40 milliGRAM(s) Oral before breakfast    Medications (PRN):acetaminophen     Tablet .. 975 milliGRAM(s) Oral every 6 hours PRN  dextrose Oral Gel 15 Gram(s) Oral once PRN  LORazepam   Injectable 2 milliGRAM(s) IV Push every 2 hours PRN  LORazepam   Injectable 2 milliGRAM(s) IV Push every 1 hour PRN      ALLERGIES:  Allergies: No Known Allergies  (Intolerances: )  ------------------------------------------------------------------------------------------  VITAL SIGNS  Vital Signs Last 24 Hrs  T(C): 36.8 (08 Dec 2024 17:00), Max: 36.9 (08 Dec 2024 16:09)  T(F): 98.3 (08 Dec 2024 17:00), Max: 98.4 (08 Dec 2024 16:09)  HR: 99 (08 Dec 2024 17:00) (72 - 108)  BP: 142/101 (08 Dec 2024 17:00) (116/87 - 169/106)  BP(mean): --  RR: 18 (08 Dec 2024 17:00) (16 - 18)  SpO2: 98% (08 Dec 2024 17:00) (94% - 99%)    Parameters below as of 08 Dec 2024 17:00  Patient On (Oxygen Delivery Method): room air    Drug Dosing Weight  Height (cm): 172.7 (08 Dec 2024 00:10)  Weight (kg): 77.111 (08 Dec 2024 00:10)  BMI (kg/m2): 25.9 (08 Dec 2024 00:10)  BSA (m2): 1.91 (08 Dec 2024 00:10)    INS/OUTS:    12-07 @ 07:01  -  12-08 @ 07:00  --------------------------------------------------------  IN:  Total IN: 0 mL    OUT:    Voided (mL): 1000 mL  Total OUT: 1000 mL    Total NET: -1000 mL      12-08 @ 07:01  -  12-08 @ 21:53  --------------------------------------------------------  IN:  Total IN: 0 mL    OUT:    Voided (mL): 200 mL  Total OUT: 200 mL    Total NET: -200 mL        Drug Dosing Weight  Height (cm): 172.7 (08 Dec 2024 00:10)  Weight (kg): 77.111 (08 Dec 2024 00:10)  BMI (kg/m2): 25.9 (08 Dec 2024 00:10)  BSA (m2): 1.91 (08 Dec 2024 00:10)  =  General: NAD  HEENT: Normocephalic, atraumatic, EOMI, PERRLA. Cervical collar in place. Left side of head and face tender without evidence of trauma  Neck: Soft, midline trachea, C-collar in place. No c-spine tenderness  Chest: L side chest wall tenderness  Cardiac: VSS, . Well-healed sternotomy scar  Respiratory: nonlabored respirations. L chest wall tender without evidence of trauma  Abdomen: Soft, non-distended, left hemiabdominal tenderness, no rebound, no guarding, no masses palpated  Pelvis: Stable, left sided tenderness, no ecchymosis  Ext: palp radial b/l UE, b/l DP palp in Lower Extrem, motor and sensory grossly intact in all 4 extremities. Tenderness in upper and lower extremities on left, without evidence of trauma  Back: no palpable runoff/stepoff/deformity, left sided back tenderness                        13.2   5.01  )-----------( 90       ( 07 Dec 2024 17:53 )             39.3     12-08    134[L]  |  94[L]  |  12  ----------------------------<  227[H]  4.1   |  22  |  0.69    Ca    8.9      08 Dec 2024 06:50  Phos  1.9     12-08  Mg     1.8     12-08    TPro  7.1  /  Alb  3.6  /  TBili  0.9  /  DBili  x   /  AST  69[H]  /  ALT  30  /  AlkPhos  54  12-07    PT/INR - ( 07 Dec 2024 17:53 )   PT: 11.9 sec;   INR: 1.04 ratio         PTT - ( 07 Dec 2024 17:53 )  PTT:30.7 sec  Urinalysis Basic - ( 08 Dec 2024 06:50 )    Color: x / Appearance: x / SG: x / pH: x  Gluc: 227 mg/dL / Ketone: x  / Bili: x / Urobili: x   Blood: x / Protein: x / Nitrite: x   Leuk Esterase: x / RBC: x / WBC x   Sq Epi: x / Non Sq Epi: x / Bacteria: x        List Injuries Identified to Date:  - mild edema in the C7 and T1 vertebral bodies, suspicious for acute mild compression fractures. No evidence of cord compression.  List Operative and Interventional Radiological Procedures:   - None    PLAN:  - No additional imaging; care per spine surgery and primary team  - Call back with questions    Trauma [facility]  [id number] / [phone] TERTIARY TRAUMA SURVEY (TTS)    Date of TTS:   12/8/24                           Time: 6am  Admit Date:  12/7/24                            Trauma Activation: N/A  Admit GCS: E-4     V- 5    M- 6    HPI:  64 year old male with hx of T2DM, CAD s/p CABG, HTN, HLD, transferred from [facility] for further evaluation after mechanical fall down stairs. The previous night, patient experienced a fall on stairs after slipping. Pt was wearing slippers and was turning around to close the door when he slipped. He fell down about 5-6 steps. He felt he may have passed out but is unsure. When he came to, he was at the bottom of the stairs and felt pain in his head, L arm, and L lateral chest.  Patient was evaluated at [facility] with CT imaging without any obvious traumatic injury.  Transferred for concern for spinal cord pathology due to left lower extremity numbness and weakness. He denies any incontinence, saddle anesthesia. Currently not endorsing any weakness or sensory changes. In the ED, patient was evaluated by trauma surgery and ortho-spine. MRI spine notable for edema in vertebral bodies of C7 and T1 concerning for compression fx. No evidence of spinal cord compression. Pt maintained in C collar. Pt was also noted to be tachycardic and  tremulous raising concern for alcohol withdrawal so was given valium in ED. Pt says he was not drinking around time of fall. BAL normal in ED. Pt admitted for further management      INTERVAL EVENTS:    PAST MEDICAL & SURGICAL HISTORY:  HTN (hypertension)      HLD (hyperlipidemia)      DM (diabetes mellitus)      CAD (coronary artery disease)      S/P CABG (coronary artery bypass graft)      History of appendectomy      CURRENT MEDICATIONS:   Medications (inpatient): aspirin enteric coated 81 milliGRAM(s) Oral daily  atorvastatin 40 milliGRAM(s) Oral at bedtime  clopidogrel Tablet 75 milliGRAM(s) Oral daily  dextrose 5%. 1000 milliLiter(s) IV Continuous <Continuous>  dextrose 5%. 1000 milliLiter(s) IV Continuous <Continuous>  dextrose 50% Injectable 25 Gram(s) IV Push once  dextrose 50% Injectable 12.5 Gram(s) IV Push once  dextrose 50% Injectable 25 Gram(s) IV Push once  enoxaparin Injectable 40 milliGRAM(s) SubCutaneous every 24 hours  glucagon  Injectable 1 milliGRAM(s) IntraMuscular once  insulin lispro (ADMELOG) corrective regimen sliding scale   SubCutaneous three times a day before meals  losartan 100 milliGRAM(s) Oral daily  pantoprazole    Tablet 40 milliGRAM(s) Oral before breakfast    Medications (PRN):acetaminophen     Tablet .. 975 milliGRAM(s) Oral every 6 hours PRN  dextrose Oral Gel 15 Gram(s) Oral once PRN  LORazepam   Injectable 2 milliGRAM(s) IV Push every 2 hours PRN  LORazepam   Injectable 2 milliGRAM(s) IV Push every 1 hour PRN      ALLERGIES:  Allergies: No Known Allergies  (Intolerances: )  ------------------------------------------------------------------------------------------  VITAL SIGNS  Vital Signs Last 24 Hrs  T(C): 36.8 (08 Dec 2024 17:00), Max: 36.9 (08 Dec 2024 16:09)  T(F): 98.3 (08 Dec 2024 17:00), Max: 98.4 (08 Dec 2024 16:09)  HR: 99 (08 Dec 2024 17:00) (72 - 108)  BP: 142/101 (08 Dec 2024 17:00) (116/87 - 169/106)  BP(mean): --  RR: 18 (08 Dec 2024 17:00) (16 - 18)  SpO2: 98% (08 Dec 2024 17:00) (94% - 99%)    Parameters below as of 08 Dec 2024 17:00  Patient On (Oxygen Delivery Method): room air    Drug Dosing Weight  Height (cm): 172.7 (08 Dec 2024 00:10)  Weight (kg): 77.111 (08 Dec 2024 00:10)  BMI (kg/m2): 25.9 (08 Dec 2024 00:10)  BSA (m2): 1.91 (08 Dec 2024 00:10)    INS/OUTS:    12-07 @ 07:01  -  12-08 @ 07:00  --------------------------------------------------------  IN:  Total IN: 0 mL    OUT:    Voided (mL): 1000 mL  Total OUT: 1000 mL    Total NET: -1000 mL      12-08 @ 07:01  -  12-08 @ 21:53  --------------------------------------------------------  IN:  Total IN: 0 mL    OUT:    Voided (mL): 200 mL  Total OUT: 200 mL    Total NET: -200 mL        Drug Dosing Weight  Height (cm): 172.7 (08 Dec 2024 00:10)  Weight (kg): 77.111 (08 Dec 2024 00:10)  BMI (kg/m2): 25.9 (08 Dec 2024 00:10)  BSA (m2): 1.91 (08 Dec 2024 00:10)  =  General: NAD  HEENT: Normocephalic, atraumatic, EOMI, PERRLA. Cervical collar in place. Left side of head and face tender without evidence of trauma  Neck: Soft, midline trachea, C-collar in place. No c-spine tenderness  Chest: L side chest wall tenderness  Cardiac: VSS, . Well-healed sternotomy scar  Respiratory: nonlabored respirations. L chest wall tender without evidence of trauma  Abdomen: Soft, non-distended, left hemiabdominal tenderness, no rebound, no guarding, no masses palpated  Pelvis: Stable, left sided tenderness, no ecchymosis  Ext: palp radial b/l UE, b/l DP palp in Lower Extrem, motor and sensory grossly intact in all 4 extremities. Tenderness in upper and lower extremities on left, without evidence of trauma  Back: no palpable runoff/stepoff/deformity, left sided back tenderness                        13.2   5.01  )-----------( 90       ( 07 Dec 2024 17:53 )             39.3     12-08    134[L]  |  94[L]  |  12  ----------------------------<  227[H]  4.1   |  22  |  0.69    Ca    8.9      08 Dec 2024 06:50  Phos  1.9     12-08  Mg     1.8     12-08    TPro  7.1  /  Alb  3.6  /  TBili  0.9  /  DBili  x   /  AST  69[H]  /  ALT  30  /  AlkPhos  54  12-07    PT/INR - ( 07 Dec 2024 17:53 )   PT: 11.9 sec;   INR: 1.04 ratio         PTT - ( 07 Dec 2024 17:53 )  PTT:30.7 sec  Urinalysis Basic - ( 08 Dec 2024 06:50 )    Color: x / Appearance: x / SG: x / pH: x  Gluc: 227 mg/dL / Ketone: x  / Bili: x / Urobili: x   Blood: x / Protein: x / Nitrite: x   Leuk Esterase: x / RBC: x / WBC x   Sq Epi: x / Non Sq Epi: x / Bacteria: x        List Injuries Identified to Date:  - mild edema in the C7 and T1 vertebral bodies, suspicious for acute mild compression fractures. No evidence of cord compression.  List Operative and Interventional Radiological Procedures:   - None    PLAN:  - F/u CTA given mechanism    Trauma [facility]  [id number] / [phone]

## 2024-12-08 NOTE — H&P ADULT - PROBLEM SELECTOR PLAN 3
Hx of DM per patient, not on medications. Managed with diet per patient  - Check A1C  - Monitor glucose levels on BMP  - Will start routine FS monitoring if persistently hyperglycemic or A1C elevated.

## 2024-12-08 NOTE — PHYSICAL THERAPY INITIAL EVALUATION ADULT - ADDITIONAL COMMENTS
Pt resides in an apartment with +6 steps to enter. Pt reports that his spouse is out of the country and unsure when she'll be returning. PTA, pt ambulated independently using a straight cane and was independent with ADLs.

## 2024-12-08 NOTE — CHART NOTE - NSCHARTNOTEFT_GEN_A_CORE
MRI Cord compression series reviewed showing likely VCFs of C7 and T1, with no evidence of cord compression. There is also concern for R vertebral artery dissection.     < from: MR Acute Spinal Cord Compression (12.07.24 @ 20:15) >    IMPRESSION:    MRI CERVICAL SPINE:  Bony details are better evaluated on CT scan.    There is edema in the C7 and T1 vertebral bodies, suspicious for acute   mild compression fractures.  There is edema within anterior vertebral   marginal osteophyte at C7-T1, suspicious for fracture of the osteophyte.    There is mild prevertebral edema at T1-T2.    No evidence of traumatic malalignment, instability or ligamentous injury.    No evidence of cord compression, cord edema or intrinsic T2 cord signal   abnormality.    There is an abnormal flow void in the right vertebral artery.  This may   be due to small vessel caliber and slow flow,  however right vertebral   artery dissection/blunt cerebrovascular injury is not excluded.    Follow-up CTA brain and/or MRI brain and MRA neck and brain with   precontrast fat-saturatedT1-weighted images is recommended.    There is small amount of fluid in the left C1-C2 facet joint trace fluid   in the right C1-C2 facet joint without evidence of malalignment or   ligamentous injury at C1-C2.    A 7 mm T2 hyperintense focus in the dens probably represents a   degenerative cyst.      MRI THORACIC SPINE:  Bony details limited better evaluated on CT scan.    Redemonstration of fractures at C7-T1.    The remainder of the thoracic spine is intact.    No evidence of thoracic cord compression, cord edema or intrinsic T2 cord   signal abnormality.    MRI LUMBAR SPINE:  Bony details are better evaluated on CT scan.    There is approximately 1.2 cm T1 hypointense focus in the superior   endplate of T1 with associated T2 signal hyperintensity on STIR images;   this is of uncertain etiology and clinical significance but may represent   an acute Schmorl's node.  CT and/or dedicated lumbar spine MRI with   postcontrast imaging may be obtained for further evaluation.  Otherwise,   there is no compelling evidence for acute lumbar spine fracture.    I discussed the major findings with Dr. Juan R Simms on 12/08/2024 at 5:05   AM.  Read back confirmation was obtained.    --- End of Report ---    KEYONNA ALONZO MD; Attending Radiologist  This document has been electronically signed. Dec  8 2024  5:11AM    < end of copied text >      Recommend:  -c/w C-collar  -WBAT  -rec additional imaging to assess for R vertebral artery dissection - CTA brain and neck already ordered by primary  -pain control  -no acute ortho spine surgery at this time  -f/u outpt with Dr. Chopra in 1 week, please call office for appt      Discussed with Dr. Chopra, attending orthopedic spine surgeon on call.

## 2024-12-08 NOTE — H&P ADULT - HISTORY OF PRESENT ILLNESS
64 year old male with hx of T2DM, CAD s/p CABG, HTN, HLD, transferred from [facility] for further evaluation after mechanical fall down stairs.  64 year old male with hx of T2DM, CAD s/p CABG, HTN, HLD, transferred from [facility] for further evaluation after mechanical fall down stairs. The previous night, patient experienced a fall on stairs after slipping. Pt was wearing slippers and was turning around to close the door when he slipped. He fell down about 5-6 steps. He felt he may have passed out but is unsure. When he came to, he was at the bottom of the stairs and felt pain in his head, L arm, and L lateral chest.  Patient was evaluated at [facility] with CT imaging without any obvious traumatic injury.  Transferred for concern for spinal cord pathology due to left lower extremity numbness and weakness. He denies any incontinence, saddle anesthesia. Currently not endorsing any weakness or sensory changes. In the ED, patient was evaluated by trauma surgery and ortho-spine. MRI spine notable for edema in vertebral bodies of C7 and T1 concerning for compression fx. No evidence of spinal cord compression. Pt maintained in C collar.  64 year old male with hx of T2DM, CAD s/p CABG, HTN, HLD, transferred from [facility] for further evaluation after mechanical fall down stairs. The previous night, patient experienced a fall on stairs after slipping. Pt was wearing slippers and was turning around to close the door when he slipped. He fell down about 5-6 steps. He felt he may have passed out but is unsure. When he came to, he was at the bottom of the stairs and felt pain in his head, L arm, and L lateral chest.  Patient was evaluated at [facility] with CT imaging without any obvious traumatic injury.  Transferred for concern for spinal cord pathology due to left lower extremity numbness and weakness. He denies any incontinence, saddle anesthesia. Currently not endorsing any weakness or sensory changes. In the ED, patient was evaluated by trauma surgery and ortho-spine. MRI spine notable for edema in vertebral bodies of C7 and T1 concerning for compression fx. No evidence of spinal cord compression. Pt maintained in C collar. Pt was also noted to be tachycardic and  tremulous raising concern for alcohol withdrawal so was given valium in ED. Pt says he was not drinking around time of fall. BAL normal in ED. Pt admitted for further management.

## 2024-12-08 NOTE — H&P ADULT - NSVTERISKASSESS_GEN_ALL_CORE FT
Medical Assessment Completed on: 08-Dec-2024 02:02 Patient examined, chart reviewed.       Patient's chief complaint is episode of epigastric pain lasting <1hour, with SOB, no nausea/diaphoresis/radiation. Occurred the day prior as well at rest. No exertional pain. Improving cough and SOB. improved with manual pressure over the area  Agree with H&P except where noted here.     #there is some concern that epigastric pain may be anginal equivalent, though fact that it improved with manual pressure makes it less likely. Should have stress test, cannot perform ETT, cannot receive regadenason due to obstructive lung disease, should have dobutamine echo. Low concern that this is related to pancreatic cyst.  #COPD/asthma: not worse, do not think patient needs prednisone. pulm consult given chronic lung disease Patient examined, chart reviewed.       Patient's chief complaint is episode of epigastric pain lasting <1hour, with SOB, no nausea/diaphoresis/radiation. Occurred the day prior as well at rest. No exertional pain. Improving cough and SOB. improved with manual pressure over the area  Agree with H&P except where noted here.     #there is some concern that epigastric pain may be anginal equivalent, though fact that it improved with manual pressure makes it less likely. Should have stress test, cannot perform ETT, cannot receive regadenason due to obstructive lung disease, should have dobutamine echo. Low concern that this is related to pancreatic cyst.  #COPD/asthma: not worse, do not think patient needs prednisone. pulm consult given chronic lung disease. continue bactrim for DIEUDONNE Patient examined, chart reviewed.       Patient's chief complaint is episode of epigastric pain lasting <1hour, with SOB, no nausea/diaphoresis/radiation. Occurred the day prior as well at rest. No exertional pain. Improving cough and SOB. improved with manual pressure over the area  Agree with H&P except where noted here.     #there is some concern that epigastric pain may be anginal equivalent, though fact that it improved with manual pressure makes it less likely. Should have stress test, cannot perform ETT, cannot receive regadenason due to obstructive lung disease, should have dobutamine echo. Low concern that this is related to pancreatic cyst.  #COPD/asthma: not worse, do not think patient needs prednisone. pulm consult given chronic lung disease. continue bactrim for DIEUDONNE. D dimer neg, no need for V/q, low concern for PE

## 2024-12-08 NOTE — H&P ADULT - NSHPREVIEWOFSYSTEMS_GEN_ALL_CORE
Review of Systems:   CONSTITUTIONAL: No fever, weight loss  EYES: No eye pain, visual disturbances, or discharge  ENMT:  No difficulty hearing, tinnitus, vertigo; No sinus or throat pain  RESPIRATORY: No SOB. No cough, wheezing, chills or hemoptysis  CARDIOVASCULAR: L lateral chest pain, no palpitation dizziness, no leg swelling  GASTROINTESTINAL: No abdominal or epigastric pain. No nausea, vomiting, or hematemesis; No diarrhea or constipation. No melena or hematochezia.  GENITOURINARY: No dysuria, frequency, hematuria, or incontinence  NEUROLOGICAL: +headaches, memory loss, loss of strength, numbness, or tremors  SKIN: No itching, burning, rashes, or lesions   MUSCULOSKELETAL: No joint pain or swelling; arm pain  HEME/LYMPH: No easy bruising, or bleeding gums

## 2024-12-08 NOTE — H&P ADULT - PROBLEM SELECTOR PLAN 6
Patient does not remember his medications and dosages. His wife is away and he has no one who can bring his medication list. Medication list currently incomplete - retrieved from [facility]  - Will need full med rec in AM  - Call pharmacy: [facility] [phone] in AM for medication list

## 2024-12-08 NOTE — PHYSICAL THERAPY INITIAL EVALUATION ADULT - NSPTDMEREC_GEN_A_CORE
Pt will require a rolling walker at home due to diagnosis of vertebral compression fracture causing decreased balance and unsteadiness during ambulation to help complete their MRADLs./rolling walker

## 2024-12-08 NOTE — H&P ADULT - NSHPLABSRESULTS_GEN_ALL_CORE
12-07    135  |  94[L]  |  9   ----------------------------<  159[H]  4.0   |  19[L]  |  0.73    Ca    8.4      07 Dec 2024 17:53    TPro  7.1  /  Alb  3.6  /  TBili  0.9  /  DBili  x   /  AST  69[H]  /  ALT  30  /  AlkPhos  54  12-07        PT/INR - ( 07 Dec 2024 17:53 )   PT: 11.9 sec;   INR: 1.04 ratio         PTT - ( 07 Dec 2024 17:53 )  PTT:30.7 sec              Urinalysis Basic - ( 07 Dec 2024 23:40 )    Color: Yellow / Appearance: Clear / SG: >1.030 / pH: x  Gluc: x / Ketone: 40 mg/dL  / Bili: Negative / Urobili: 2.0 mg/dL   Blood: x / Protein: 30 mg/dL / Nitrite: Negative   Leuk Esterase: Negative / RBC: 31 /HPF / WBC 0 /HPF   Sq Epi: x / Non Sq Epi: 0 /HPF / Bacteria: Negative /HPF                          13.2   5.01  )-----------( 90       ( 07 Dec 2024 17:53 )             39.3 12-07    135  |  94[L]  |  9   ----------------------------<  159[H]  4.0   |  19[L]  |  0.73    Ca    8.4      07 Dec 2024 17:53    TPro  7.1  /  Alb  3.6  /  TBili  0.9  /  DBili  x   /  AST  69[H]  /  ALT  30  /  AlkPhos  54  12-07        PT/INR - ( 07 Dec 2024 17:53 )   PT: 11.9 sec;   INR: 1.04 ratio         PTT - ( 07 Dec 2024 17:53 )  PTT:30.7 sec              Urinalysis Basic - ( 07 Dec 2024 23:40 )    Color: Yellow / Appearance: Clear / SG: >1.030 / pH: x  Gluc: x / Ketone: 40 mg/dL  / Bili: Negative / Urobili: 2.0 mg/dL   Blood: x / Protein: 30 mg/dL / Nitrite: Negative   Leuk Esterase: Negative / RBC: 31 /HPF / WBC 0 /HPF   Sq Epi: x / Non Sq Epi: 0 /HPF / Bacteria: Negative /HPF                          13.2   5.01  )-----------( 90       ( 07 Dec 2024 17:53 )             39.3    IMAGING    < from: MR Acute Spinal Cord Compression (12.07.24 @ 20:15) >    impression:  Mild edema in the C7 and T1 vertebral bodies may suspicious for acute   mild compression fractures.    Straightening of the cervical lordosis.  Degenerative grade 1   anterolisthesis at C2-C3 and C3-C4.  Degenerative grade 1 retrolisthesis   at C7-T1.  No evidence of ligamentous injury or traumatic malalignment.    No cord compression, cord edema or cord signal abnormality in the   cervical or thoracic spine.    No cauda equina compression.    < end of copied text >

## 2024-12-08 NOTE — PHYSICAL THERAPY INITIAL EVALUATION ADULT - PERTINENT HX OF CURRENT PROBLEM, REHAB EVAL
64 year old male with hx of T2DM, CAD s/p CABG, HTN, HLD, transferred from [facility] for further evaluation after mechanical fall down stairs with concerns for spinal cord injury. MRI spine here without cord compression but noted to have possible vertebral compression fracture of C7, T1. Pt admitted for further workup and management. MRI head (12/7) No MRI evidence of acute intracranial pathology. MRI acute spinal cord compression (12/7) There is edema in the C7 and T1 vertebral bodies, suspicious for acute   mild compression fractures.  There is edema within anterior vertebral marginal osteophyte at C7-T1, suspicious for fracture of the osteophyte.  There is mild prevertebral edema at T1-T2. No evidence of cord compression, cord edema or intrinsic T2 cord signal abnormality. There is an abnormal flow void in the right vertebral artery.  This may be due to small vessel caliber and slow flow,  however right vertebral   artery dissection/blunt cerebrovascular injury is not excluded.

## 2024-12-08 NOTE — H&P ADULT - PATIENT'S SEXUAL ORIENTATION
Heterosexual Hydroxychloroquine Counseling:  I discussed with the patient that a baseline ophthalmologic exam is needed at the start of therapy and every year thereafter while on therapy. A CBC may also be warranted for monitoring.  The side effects of this medication were discussed with the patient, including but not limited to agranulocytosis, aplastic anemia, seizures, rashes, retinopathy, and liver toxicity. Patient instructed to call the office should any adverse effect occur.  The patient verbalized understanding of the proper use and possible adverse effects of Plaquenil.  All the patient's questions and concerns were addressed.

## 2024-12-08 NOTE — H&P ADULT - PROBLEM SELECTOR PLAN 2
Concern for alcohol withdrawal earlier in ED given tachycardia, tongue fasciculations, bilateral hand tremor. Pt does not endorse significant alcohol consumption - about one drink every 1-2 weeks. Last drink per patient on Wednesday 12/4. Pt was not drinking at time of fall. BAL normal  - S/p IV diazepam in ED with reported improvement in symptoms; CIWAs 4  - Will continue with CIWA protocol for now to monitor; Symptom triggered ativan ordered

## 2024-12-08 NOTE — H&P ADULT - NSHPPHYSICALEXAM_GEN_ALL_CORE
Vital Signs Last 24 Hrs  T(C): 36.8 (08 Dec 2024 00:10), Max: 37.1 (07 Dec 2024 20:54)  T(F): 98.3 (08 Dec 2024 00:10), Max: 98.8 (07 Dec 2024 20:54)  HR: 93 (08 Dec 2024 00:10) (93 - 120)  BP: 145/96 (08 Dec 2024 00:10) (145/96 - 168/108)  BP(mean): --  RR: 16 (08 Dec 2024 00:10) (16 - 20)  SpO2: 97% (08 Dec 2024 00:10) (95% - 97%)    Parameters below as of 07 Dec 2024 23:39  Patient On (Oxygen Delivery Method): room air        CONSTITUTIONAL: Well-groomed, in no apparent distress  EYES: No conjunctival or scleral injection, non-icteric; PERRLA and symmetric  ENMT: No external nasal lesions; nasal mucosa not inflamed; oral mucosa with moist membranes; Cervical collar in place  RESPIRATORY: Breathing comfortably; lungs CTA without wheeze/rhonchi/rales  CARDIOVASCULAR: +S1S2, RRR, no M/G/R; no lower extremity edema  GASTROINTESTINAL: No palpable masses or tenderness, +BS throughout, no rebound/guarding  MUSCULOSKELETAL: No joint swelling, no digital clubbing or cyanosis; no malalignment of extremities, pain on palpation of L lateral chest overlying ribs  SKIN: No rashes or ulcers noted; no subcutaneous nodules or induration palpable  NEUROLOGIC: CN grossly intact; sensation intact in LEs b/l to light touch; normal strength and tone  PSYCHIATRIC: A+O x 3; mood and affect appropriate; appropriate insight and judgment

## 2024-12-08 NOTE — PHYSICAL THERAPY INITIAL EVALUATION ADULT - PLANNED THERAPY INTERVENTIONS, PT EVAL
GOAL: Pt will negotiate up/down 6 steps with + handrail ascending using no device independently in 2 weeks/balance training/bed mobility training/gait training/strengthening/transfer training

## 2024-12-09 LAB
A1C WITH ESTIMATED AVERAGE GLUCOSE RESULT: 6.9 % — HIGH (ref 4–5.6)
ADD ON TEST-SPECIMEN IN LAB: SIGNIFICANT CHANGE UP
ANION GAP SERPL CALC-SCNC: 15 MMOL/L — SIGNIFICANT CHANGE UP (ref 5–17)
BUN SERPL-MCNC: 14 MG/DL — SIGNIFICANT CHANGE UP (ref 7–23)
CALCIUM SERPL-MCNC: 9.3 MG/DL — SIGNIFICANT CHANGE UP (ref 8.4–10.5)
CHLORIDE SERPL-SCNC: 96 MMOL/L — SIGNIFICANT CHANGE UP (ref 96–108)
CO2 SERPL-SCNC: 21 MMOL/L — LOW (ref 22–31)
CREAT SERPL-MCNC: 0.72 MG/DL — SIGNIFICANT CHANGE UP (ref 0.5–1.3)
EGFR: 102 ML/MIN/1.73M2 — SIGNIFICANT CHANGE UP
ESTIMATED AVERAGE GLUCOSE: 151 MG/DL — HIGH (ref 68–114)
GLUCOSE BLDC GLUCOMTR-MCNC: 125 MG/DL — HIGH (ref 70–99)
GLUCOSE BLDC GLUCOMTR-MCNC: 144 MG/DL — HIGH (ref 70–99)
GLUCOSE BLDC GLUCOMTR-MCNC: 145 MG/DL — HIGH (ref 70–99)
GLUCOSE BLDC GLUCOMTR-MCNC: 154 MG/DL — HIGH (ref 70–99)
GLUCOSE SERPL-MCNC: 166 MG/DL — HIGH (ref 70–99)
MAGNESIUM SERPL-MCNC: 1.9 MG/DL — SIGNIFICANT CHANGE UP (ref 1.6–2.6)
POTASSIUM SERPL-MCNC: 3.4 MMOL/L — LOW (ref 3.5–5.3)
POTASSIUM SERPL-SCNC: 3.4 MMOL/L — LOW (ref 3.5–5.3)
SODIUM SERPL-SCNC: 132 MMOL/L — LOW (ref 135–145)

## 2024-12-09 PROCEDURE — 99222 1ST HOSP IP/OBS MODERATE 55: CPT

## 2024-12-09 PROCEDURE — 99232 SBSQ HOSP IP/OBS MODERATE 35: CPT

## 2024-12-09 RX ORDER — NICOTINE 21 MG/24H
1 PATCH, EXTENDED RELEASE TRANSDERMAL DAILY
Refills: 0 | Status: DISCONTINUED | OUTPATIENT
Start: 2024-12-09 | End: 2024-12-16

## 2024-12-09 RX ORDER — LORAZEPAM 2 MG/1
2 TABLET ORAL ONCE
Refills: 0 | Status: DISCONTINUED | OUTPATIENT
Start: 2024-12-09 | End: 2024-12-09

## 2024-12-09 RX ORDER — LORAZEPAM 2 MG/1
1.5 TABLET ORAL EVERY 4 HOURS
Refills: 0 | Status: DISCONTINUED | OUTPATIENT
Start: 2024-12-10 | End: 2024-12-10

## 2024-12-09 RX ORDER — POTASSIUM CHLORIDE 600 MG/1
40 TABLET, EXTENDED RELEASE ORAL ONCE
Refills: 0 | Status: COMPLETED | OUTPATIENT
Start: 2024-12-09 | End: 2024-12-09

## 2024-12-09 RX ORDER — LORAZEPAM 2 MG/1
TABLET ORAL
Refills: 0 | Status: DISCONTINUED | OUTPATIENT
Start: 2024-12-10 | End: 2024-12-10

## 2024-12-09 RX ORDER — LORAZEPAM 2 MG/1
2 TABLET ORAL EVERY 4 HOURS
Refills: 0 | Status: DISCONTINUED | OUTPATIENT
Start: 2024-12-09 | End: 2024-12-10

## 2024-12-09 RX ADMIN — LORAZEPAM 2 MILLIGRAM(S): 2 TABLET ORAL at 21:03

## 2024-12-09 RX ADMIN — ACETAMINOPHEN 500MG 975 MILLIGRAM(S): 500 TABLET, COATED ORAL at 22:00

## 2024-12-09 RX ADMIN — LORAZEPAM 2 MILLIGRAM(S): 2 TABLET ORAL at 09:17

## 2024-12-09 RX ADMIN — LORAZEPAM 2 MILLIGRAM(S): 2 TABLET ORAL at 09:50

## 2024-12-09 RX ADMIN — PANTOPRAZOLE SODIUM 40 MILLIGRAM(S): 40 TABLET, DELAYED RELEASE ORAL at 06:01

## 2024-12-09 RX ADMIN — Medication 40 MILLIGRAM(S): at 20:57

## 2024-12-09 RX ADMIN — ENOXAPARIN SODIUM 40 MILLIGRAM(S): 30 INJECTION SUBCUTANEOUS at 06:01

## 2024-12-09 RX ADMIN — NICOTINE 1 PATCH: 21 PATCH, EXTENDED RELEASE TRANSDERMAL at 13:44

## 2024-12-09 RX ADMIN — Medication 1: at 16:54

## 2024-12-09 RX ADMIN — LORAZEPAM 2 MILLIGRAM(S): 2 TABLET ORAL at 13:44

## 2024-12-09 RX ADMIN — Medication 81 MILLIGRAM(S): at 11:35

## 2024-12-09 RX ADMIN — POTASSIUM CHLORIDE 40 MILLIEQUIVALENT(S): 600 TABLET, EXTENDED RELEASE ORAL at 17:05

## 2024-12-09 RX ADMIN — LOSARTAN POTASSIUM 100 MILLIGRAM(S): 100 TABLET, FILM COATED ORAL at 06:01

## 2024-12-09 RX ADMIN — LORAZEPAM 2 MILLIGRAM(S): 2 TABLET ORAL at 10:00

## 2024-12-09 RX ADMIN — LORAZEPAM 2 MILLIGRAM(S): 2 TABLET ORAL at 12:54

## 2024-12-09 RX ADMIN — LORAZEPAM 2 MILLIGRAM(S): 2 TABLET ORAL at 15:26

## 2024-12-09 RX ADMIN — LORAZEPAM 2 MILLIGRAM(S): 2 TABLET ORAL at 07:30

## 2024-12-09 RX ADMIN — CLOPIDOGREL 75 MILLIGRAM(S): 75 TABLET, FILM COATED ORAL at 11:35

## 2024-12-09 RX ADMIN — Medication 100 GRAM(S): at 16:41

## 2024-12-09 RX ADMIN — ACETAMINOPHEN 500MG 975 MILLIGRAM(S): 500 TABLET, COATED ORAL at 21:04

## 2024-12-09 RX ADMIN — LORAZEPAM 2 MILLIGRAM(S): 2 TABLET ORAL at 17:04

## 2024-12-09 RX ADMIN — NICOTINE 1 PATCH: 21 PATCH, EXTENDED RELEASE TRANSDERMAL at 20:29

## 2024-12-09 RX ADMIN — LORAZEPAM 2 MILLIGRAM(S): 2 TABLET ORAL at 21:04

## 2024-12-09 NOTE — PROGRESS NOTE ADULT - SUBJECTIVE AND OBJECTIVE BOX
[facility] Division of Hospital Medicine  Christianne Lima MD  Available via MS Teams    SUBJECTIVE / OVERNIGHT EVENTS:  no acute events   pt endorsing mild-mod neck pain, no other symptoms at this time   per RN, pt was slightly agitated earlier, climbing out of bed, possibly endorsing visual hallucinations - was calmer at time of my visit, denies hallucinations     ADDITIONAL REVIEW OF SYSTEMS:  14 point ROS negative except as noted above     MEDICATIONS  (STANDING):  aspirin enteric coated 81 milliGRAM(s) Oral daily  atorvastatin 40 milliGRAM(s) Oral at bedtime  clopidogrel Tablet 75 milliGRAM(s) Oral daily  dextrose 5%. 1000 milliLiter(s) (50 mL/Hr) IV Continuous <Continuous>  dextrose 5%. 1000 milliLiter(s) (100 mL/Hr) IV Continuous <Continuous>  dextrose 50% Injectable 25 Gram(s) IV Push once  dextrose 50% Injectable 12.5 Gram(s) IV Push once  dextrose 50% Injectable 25 Gram(s) IV Push once  enoxaparin Injectable 40 milliGRAM(s) SubCutaneous every 24 hours  glucagon  Injectable 1 milliGRAM(s) IntraMuscular once  insulin lispro (ADMELOG) corrective regimen sliding scale   SubCutaneous three times a day before meals  LORazepam     Tablet 2 milliGRAM(s) Oral every 4 hours  LORazepam     Tablet   Oral   losartan 100 milliGRAM(s) Oral daily  nicotine - 21 mG/24Hr(s) Patch 1 Patch Transdermal daily  pantoprazole    Tablet 40 milliGRAM(s) Oral before breakfast  potassium chloride    Tablet ER 40 milliEquivalent(s) Oral once    MEDICATIONS  (PRN):  acetaminophen     Tablet .. 975 milliGRAM(s) Oral every 6 hours PRN Temp greater or equal to 38C (100.4F), Moderate Pain (4 - 6)  dextrose Oral Gel 15 Gram(s) Oral once PRN Blood Glucose LESS THAN 70 milliGRAM(s)/deciliter  LORazepam   Injectable 2 milliGRAM(s) IV Push every 2 hours PRN CIWA-Ar score increase by 2 points and a total score of 7 or less  LORazepam   Injectable 2 milliGRAM(s) IV Push every 1 hour PRN CIWA-Ar score 8 or greater      I&O's Summary    08 Dec 2024 07:01  -  09 Dec 2024 07:00  --------------------------------------------------------  IN: 0 mL / OUT: 200 mL / NET: -200 mL        PHYSICAL EXAM:  Vital Signs Last 24 Hrs  T(C): 36.7 (09 Dec 2024 09:24), Max: 36.9 (08 Dec 2024 16:09)  T(F): 98 (09 Dec 2024 09:24), Max: 98.4 (08 Dec 2024 16:09)  HR: 74 (09 Dec 2024 09:24) (74 - 99)  BP: 160/87 (09 Dec 2024 09:24) (142/101 - 163/99)  BP(mean): --  RR: 16 (09 Dec 2024 09:24) (15 - 18)  SpO2: 94% (09 Dec 2024 09:24) (94% - 98%)    Parameters below as of 09 Dec 2024 09:24  Patient On (Oxygen Delivery Method): room air      PHYSICAL EXAM:  GENERAL: NAD, elderly  HEAD:  Atraumatic, normocephalic  EYES: EOMI, conjunctiva and sclera clear  NECK: C collar in place   CHEST/LUNG: Clear to auscultation bilaterally; no wheezing or rales  HEART: Regular rate and rhythm; no murmurs, no LE edema   ABDOMEN: Soft, nontender, nondistended; bowel sounds present  EXTREMITIES:  2+ Peripheral Pulses, no clubbing, cyanosis  PSYCH: alert, awake, calm affect, not anxious  SKIN: No rashes or lesions      LABS:                        13.2   5.01  )-----------( 90       ( 07 Dec 2024 17:53 )             39.3     12-09    132[L]  |  96  |  14  ----------------------------<  166[H]  3.4[L]   |  21[L]  |  0.72    Ca    9.3      09 Dec 2024 07:35  Phos  1.9     12-08  Mg     1.9     12-09    TPro  7.1  /  Alb  3.6  /  TBili  0.9  /  DBili  x   /  AST  69[H]  /  ALT  30  /  AlkPhos  54  12-07    PT/INR - ( 07 Dec 2024 17:53 )   PT: 11.9 sec;   INR: 1.04 ratio         PTT - ( 07 Dec 2024 17:53 )  PTT:30.7 sec      Urinalysis Basic - ( 09 Dec 2024 07:35 )    Color: x / Appearance: x / SG: x / pH: x  Gluc: 166 mg/dL / Ketone: x  / Bili: x / Urobili: x   Blood: x / Protein: x / Nitrite: x   Leuk Esterase: x / RBC: x / WBC x   Sq Epi: x / Non Sq Epi: x / Bacteria: x            RADIOLOGY & ADDITIONAL TESTS:  New Imaging Personally Reviewed Today:  New Electrocardiogram Personally Reviewed Today:  Other Results Reviewed Today:   Prior or Outpatient Records Reviewed Today with Summary:    COORDINATION OF CARE:  Consultant Communication and Details of Discussion (where applicable):

## 2024-12-09 NOTE — CONSULT NOTE ADULT - SUBJECTIVE AND OBJECTIVE BOX
Patient is a 64y old  Male who presents with a chief complaint of fall   HPI:  64 year old male with hx of T2DM, CAD s/p CABG, HTN, HLD, transferred from [facility] for further evaluation after mechanical fall down stairs. The previous night, patient experienced a fall on stairs after slipping. Pt was wearing slippers and was turning around to close the door when he slipped. He fell down about 5-6 steps. He felt he may have passed out but is unsure. When he came to, he was at the bottom of the stairs and felt pain in his head, L arm, and L lateral chest.  Patient was evaluated at [facility] with CT imaging without any obvious traumatic injury.  Transferred for concern for spinal cord pathology due to left lower extremity numbness and weakness. He denies any incontinence, saddle anesthesia. Currently not endorsing any weakness or sensory changes. In the ED, patient was evaluated by trauma surgery and ortho-spine. MRI spine notable for edema in vertebral bodies of C7 and T1 concerning for compression fx. No evidence of spinal cord compression. Pt maintained in C collar. Pt was also noted to be tachycardic and  tremulous raising concern for alcohol withdrawal so was given valium in ED. Pt says he was not drinking around time of fall. BAL normal in ED. Pt admitted for further management.  (08 Dec 2024 01:55)      64yM was admitted on 12-07, seen today. Patient denies pain, no back pain. Has some nausea this morning.       REVIEW OF SYSTEMS  Denies chest pain, SOB, F/C, abdominal pain     VITALS  T(C): 36.7 (12-09-24 @ 09:24), Max: 36.9 (12-08-24 @ 16:09)  HR: 74 (12-09-24 @ 09:24) (74 - 99)  BP: 160/87 (12-09-24 @ 09:24) (142/101 - 163/99)  RR: 16 (12-09-24 @ 09:24) (15 - 18)  SpO2: 94% (12-09-24 @ 09:24) (94% - 98%)  Wt(kg): --    PAST MEDICAL & SURGICAL HISTORY  HTN (hypertension)    HLD (hyperlipidemia)    DM (diabetes mellitus)    CAD (coronary artery disease)    S/P CABG (coronary artery bypass graft)    History of appendectomy        FUNCTIONAL HISTORY  Lives alone, 6 steps to enter    AMB with SC and independent with ADLs PTA     CURRENT FUNCTIONAL STATUS  PT  12/8  bed mobility CG  transfers CG with RW  gait CG with RW x 10 feet   standing balance fair   follows 100% commands d    RECENT LABS/IMAGING  CBC Full  -  ( 07 Dec 2024 17:53 )  WBC Count : 5.01 K/uL  RBC Count : 4.20 M/uL  Hemoglobin : 13.2 g/dL  Hematocrit : 39.3 %  Platelet Count - Automated : 90 K/uL  Mean Cell Volume : 93.6 fl  Mean Cell Hemoglobin : 31.4 pg  Mean Cell Hemoglobin Concentration : 33.6 g/dL  Auto Neutrophil # : 3.83 K/uL  Auto Lymphocyte # : 0.79 K/uL  Auto Monocyte # : 0.28 K/uL  Auto Eosinophil # : 0.03 K/uL  Auto Basophil # : 0.04 K/uL  Auto Neutrophil % : 76.4 %  Auto Lymphocyte % : 15.8 %  Auto Monocyte % : 5.6 %  Auto Eosinophil % : 0.6 %  Auto Basophil % : 0.8 %    12-09    132[L]  |  96  |  14  ----------------------------<  166[H]  3.4[L]   |  21[L]  |  0.72    Ca    9.3      09 Dec 2024 07:35  Phos  1.9     12-08  Mg     1.8     12-08    TPro  7.1  /  Alb  3.6  /  TBili  0.9  /  DBili  x   /  AST  69[H]  /  ALT  30  /  AlkPhos  54  12-07    Urinalysis Basic - ( 09 Dec 2024 07:35 )    Color: x / Appearance: x / SG: x / pH: x  Gluc: 166 mg/dL / Ketone: x  / Bili: x / Urobili: x   Blood: x / Protein: x / Nitrite: x   Leuk Esterase: x / RBC: x / WBC x   Sq Epi: x / Non Sq Epi: x / Bacteria: x    < from: MR Acute Spinal Cord Compression (12.07.24 @ 20:15) >    IMPRESSION:    MRI CERVICAL SPINE:  Bony details are better evaluated onCT scan.    There is edema in the C7 and T1 vertebral bodies, suspicious for acute   mild compression fractures.  There is edema within anterior vertebral   marginal osteophyte at C7-T1, suspicious for fracture of the osteophyte.    There is mild prevertebral edema at T1-T2.    No evidence of traumatic malalignment, instability or ligamentous injury.    No evidence of cord compression, cord edema or intrinsic T2 cord signal   abnormality.    There is an abnormal flow void in the right vertebral artery.  This may   be due to small vessel caliber and slow flow,  however right vertebral   artery dissection/blunt cerebrovascular injury is not excluded.    Follow-up CTA brain and/or MRI brain and MRA neck and brain with   precontrast fat-saturatedT1-weighted images is recommended.    There is small amount of fluid in the left C1-C2 facet joint trace fluid   in the right C1-C2 facet joint without evidence of malalignment or   ligamentous injury at C1-C2.    A 7 mm T2 hyperintense focus in the dens probably represents a   degenerative cyst.        MRI THORACIC SPINE:  Bony details limited better evaluated on CT scan.    Redemonstration of fractures at C7-T1.    The remainder of the thoracic spine is intact.    No evidence of thoracic cord compression, cord edema or intrinsic T2 cord   signal abnormality.    MRI LUMBAR SPINE:  Bony details are better evaluated on CT scan.    There is approximately 1.2 cm T1 hypointense focus in the superior   endplate of T1 with associated T2 signal hyperintensity on STIR images;   this is of uncertain etiology and clinical significance but may represent   an acute Schmorl's node.  CT and/or dedicated lumbar spine MRI with   postcontrast imaging may be obtained for further evaluation.  Otherwise,   there is no compelling evidence for acute lumbar spine fracture.      < end of copied text >    < from: MR Head No Cont (12.07.24 @ 20:15) >      IMPRESSION:  No MRI evidence of acute intracranial pathology.    There are two punctate chronic microhemorrhages in the right frontal lobe   and a chronic microhemorrhage in the left.  Frontal white matter.  No   evidence of a lobar hemorrhage..    Patchy mild microvascular type changes in the periventricular and deep   white matter.    Complete versus near complete opacification of the left maxillary sinus   with mucosal thickening, intrasinus fluid and a punctate focus of   susceptibility that may represent gas or calcified inspissated   secretions.  Acute sinusitis should be excluded clinically.    Small mastoid effusions bilaterally.  Correlate clinically for sterile   effusion versus acute otitis media.        < end of copied text >      ALLERGIES  No Known Allergies      MEDICATIONS   acetaminophen     Tablet .. 975 milliGRAM(s) Oral every 6 hours PRN  aspirin enteric coated 81 milliGRAM(s) Oral daily  atorvastatin 40 milliGRAM(s) Oral at bedtime  clopidogrel Tablet 75 milliGRAM(s) Oral daily  dextrose 5%. 1000 milliLiter(s) IV Continuous <Continuous>  dextrose 5%. 1000 milliLiter(s) IV Continuous <Continuous>  dextrose 50% Injectable 25 Gram(s) IV Push once  dextrose 50% Injectable 12.5 Gram(s) IV Push once  dextrose 50% Injectable 25 Gram(s) IV Push once  dextrose Oral Gel 15 Gram(s) Oral once PRN  enoxaparin Injectable 40 milliGRAM(s) SubCutaneous every 24 hours  glucagon  Injectable 1 milliGRAM(s) IntraMuscular once  insulin lispro (ADMELOG) corrective regimen sliding scale   SubCutaneous three times a day before meals  LORazepam     Tablet   Oral   LORazepam     Tablet 2 milliGRAM(s) Oral every 4 hours  LORazepam   Injectable 2 milliGRAM(s) IV Push every 2 hours PRN  LORazepam   Injectable 2 milliGRAM(s) IV Push every 1 hour PRN  losartan 100 milliGRAM(s) Oral daily  nicotine - 21 mG/24Hr(s) Patch 1 Patch Transdermal daily  pantoprazole    Tablet 40 milliGRAM(s) Oral before breakfast  potassium chloride    Tablet ER 40 milliEquivalent(s) Oral once      ----------------------------------------------------------------------------------------  PHYSICAL EXAM  Constitutional - NAD, Comfortable, laying in bed   Neck - cervical collar   Chest - Breathing comfortably on room air   Cardiovascular - S1S2   Extremities - No C/C/E, No calf tenderness   Neurologic Exam -   follows commands                 Cognitive - Awake, Alert, AAO to self, place, date, year, situation     Communication - Fluent, No dysarthria        Motor -moves all ext      Sensory - Intact to LT     Psychiatric - Mood stable, Affect WNL  ----------------------------------------------------------------------------------------  ASSESSMENT/PLAN  64yMale h/o DM, HTN, CAD with functional deficits after fall  MRI brain with no acute findings   MRI spine with C7, T1 compression fractures, no acute surgical intervention  CTA brain, neck pending   ETOH on CIWA  Pain - Tylenol  DVT PPX - SCDs lovenox   Rehab -    patient required CG on PT evaluation. Needs OT evaluation   continue bedside therapy while admitted to prevent secondary complications of immobility, bed mobility, transfer training, progressive ambulation, equipment evaluation, ADLs   OOB throughout the day with staff, OOB to chair with meals/3 hours daily       patient may benefit from inpatient rehabilitation, depending on progress with bedside therapy/OT evaluation   he prefers discharge to home with outpatient follow up      Will continue to follow for ongoing rehab needs and recommendations.       55 minutes spent, reviewing hospital course, therapy notes, relevant imaging, exam, education about inpatient rehabilitation, documentation, and discussion with  and rehabilitation team

## 2024-12-10 DIAGNOSIS — F10.939 ALCOHOL USE, UNSPECIFIED WITH WITHDRAWAL, UNSPECIFIED: ICD-10-CM

## 2024-12-10 LAB
ANION GAP SERPL CALC-SCNC: 14 MMOL/L — SIGNIFICANT CHANGE UP (ref 5–17)
BUN SERPL-MCNC: 14 MG/DL — SIGNIFICANT CHANGE UP (ref 7–23)
CALCIUM SERPL-MCNC: 9.6 MG/DL — SIGNIFICANT CHANGE UP (ref 8.4–10.5)
CHLORIDE SERPL-SCNC: 98 MMOL/L — SIGNIFICANT CHANGE UP (ref 96–108)
CO2 SERPL-SCNC: 21 MMOL/L — LOW (ref 22–31)
CREAT SERPL-MCNC: 0.76 MG/DL — SIGNIFICANT CHANGE UP (ref 0.5–1.3)
EGFR: 100 ML/MIN/1.73M2 — SIGNIFICANT CHANGE UP
GLUCOSE BLDC GLUCOMTR-MCNC: 127 MG/DL — HIGH (ref 70–99)
GLUCOSE BLDC GLUCOMTR-MCNC: 144 MG/DL — HIGH (ref 70–99)
GLUCOSE BLDC GLUCOMTR-MCNC: 154 MG/DL — HIGH (ref 70–99)
GLUCOSE BLDC GLUCOMTR-MCNC: 159 MG/DL — HIGH (ref 70–99)
GLUCOSE SERPL-MCNC: 147 MG/DL — HIGH (ref 70–99)
MAGNESIUM SERPL-MCNC: 2 MG/DL — SIGNIFICANT CHANGE UP (ref 1.6–2.6)
PHOSPHATE SERPL-MCNC: 2 MG/DL — LOW (ref 2.5–4.5)
POTASSIUM SERPL-MCNC: 3.8 MMOL/L — SIGNIFICANT CHANGE UP (ref 3.5–5.3)
POTASSIUM SERPL-SCNC: 3.8 MMOL/L — SIGNIFICANT CHANGE UP (ref 3.5–5.3)
SODIUM SERPL-SCNC: 133 MMOL/L — LOW (ref 135–145)

## 2024-12-10 PROCEDURE — 99232 SBSQ HOSP IP/OBS MODERATE 35: CPT

## 2024-12-10 RX ORDER — CYANOCOBALAMIN/FOLIC AC/VIT B6 1-2.2-25MG
1 TABLET ORAL DAILY
Refills: 0 | Status: DISCONTINUED | OUTPATIENT
Start: 2024-12-10 | End: 2024-12-16

## 2024-12-10 RX ORDER — LORAZEPAM 2 MG/1
0.5 TABLET ORAL EVERY 4 HOURS
Refills: 0 | Status: DISCONTINUED | OUTPATIENT
Start: 2024-12-13 | End: 2024-12-14

## 2024-12-10 RX ORDER — LORAZEPAM 2 MG/1
1.5 TABLET ORAL EVERY 4 HOURS
Refills: 0 | Status: DISCONTINUED | OUTPATIENT
Start: 2024-12-11 | End: 2024-12-12

## 2024-12-10 RX ORDER — GLUCOSAMINE SULFATE DIPOT CHLR 500 MG
1 CAPSULE ORAL DAILY
Refills: 0 | Status: DISCONTINUED | OUTPATIENT
Start: 2024-12-10 | End: 2024-12-16

## 2024-12-10 RX ORDER — LORAZEPAM 2 MG/1
TABLET ORAL
Refills: 0 | Status: DISCONTINUED | OUTPATIENT
Start: 2024-12-13 | End: 2024-12-15

## 2024-12-10 RX ORDER — POTASSIUM PHOSPHATE, MONOBASIC POTASSIUM PHOSPHATE, DIBASIC INJECTION, 236; 224 MG/ML; MG/ML
30 SOLUTION, CONCENTRATE INTRAVENOUS ONCE
Refills: 0 | Status: COMPLETED | OUTPATIENT
Start: 2024-12-10 | End: 2024-12-10

## 2024-12-10 RX ORDER — LORAZEPAM 2 MG/1
1 TABLET ORAL EVERY 4 HOURS
Refills: 0 | Status: DISCONTINUED | OUTPATIENT
Start: 2024-12-12 | End: 2024-12-13

## 2024-12-10 RX ORDER — LORAZEPAM 2 MG/1
2 TABLET ORAL EVERY 4 HOURS
Refills: 0 | Status: DISCONTINUED | OUTPATIENT
Start: 2024-12-10 | End: 2024-12-11

## 2024-12-10 RX ORDER — LORAZEPAM 2 MG/1
0.5 TABLET ORAL EVERY 12 HOURS
Refills: 0 | Status: DISCONTINUED | OUTPATIENT
Start: 2024-12-14 | End: 2024-12-15

## 2024-12-10 RX ORDER — LANOLIN ALCOHOL/MO/W.PET/CERES
100 CREAM (GRAM) TOPICAL DAILY
Refills: 0 | Status: DISCONTINUED | OUTPATIENT
Start: 2024-12-10 | End: 2024-12-16

## 2024-12-10 RX ORDER — SODIUM CHLORIDE 9 MG/ML
1000 INJECTION, SOLUTION INTRAMUSCULAR; INTRAVENOUS; SUBCUTANEOUS
Refills: 0 | Status: DISCONTINUED | OUTPATIENT
Start: 2024-12-10 | End: 2024-12-11

## 2024-12-10 RX ADMIN — LORAZEPAM 2 MILLIGRAM(S): 2 TABLET ORAL at 05:16

## 2024-12-10 RX ADMIN — ACETAMINOPHEN 500MG 975 MILLIGRAM(S): 500 TABLET, COATED ORAL at 10:00

## 2024-12-10 RX ADMIN — LORAZEPAM 2 MILLIGRAM(S): 2 TABLET ORAL at 13:54

## 2024-12-10 RX ADMIN — ACETAMINOPHEN 500MG 975 MILLIGRAM(S): 500 TABLET, COATED ORAL at 16:12

## 2024-12-10 RX ADMIN — LORAZEPAM 2 MILLIGRAM(S): 2 TABLET ORAL at 18:25

## 2024-12-10 RX ADMIN — Medication 1: at 08:09

## 2024-12-10 RX ADMIN — Medication 81 MILLIGRAM(S): at 11:28

## 2024-12-10 RX ADMIN — NICOTINE 1 PATCH: 21 PATCH, EXTENDED RELEASE TRANSDERMAL at 19:40

## 2024-12-10 RX ADMIN — CLOPIDOGREL 75 MILLIGRAM(S): 75 TABLET, FILM COATED ORAL at 11:28

## 2024-12-10 RX ADMIN — LORAZEPAM 1.5 MILLIGRAM(S): 2 TABLET ORAL at 10:23

## 2024-12-10 RX ADMIN — Medication 1 MILLIGRAM(S): at 18:25

## 2024-12-10 RX ADMIN — LOSARTAN POTASSIUM 100 MILLIGRAM(S): 100 TABLET, FILM COATED ORAL at 05:17

## 2024-12-10 RX ADMIN — Medication 40 MILLIGRAM(S): at 21:00

## 2024-12-10 RX ADMIN — PANTOPRAZOLE SODIUM 40 MILLIGRAM(S): 40 TABLET, DELAYED RELEASE ORAL at 05:17

## 2024-12-10 RX ADMIN — POTASSIUM PHOSPHATE, MONOBASIC POTASSIUM PHOSPHATE, DIBASIC INJECTION, 83.33 MILLIMOLE(S): 236; 224 SOLUTION, CONCENTRATE INTRAVENOUS at 11:28

## 2024-12-10 RX ADMIN — ACETAMINOPHEN 500MG 975 MILLIGRAM(S): 500 TABLET, COATED ORAL at 08:49

## 2024-12-10 RX ADMIN — LORAZEPAM 2 MILLIGRAM(S): 2 TABLET ORAL at 08:08

## 2024-12-10 RX ADMIN — SODIUM CHLORIDE 100 MILLILITER(S): 9 INJECTION, SOLUTION INTRAMUSCULAR; INTRAVENOUS; SUBCUTANEOUS at 10:09

## 2024-12-10 RX ADMIN — LORAZEPAM 2 MILLIGRAM(S): 2 TABLET ORAL at 00:18

## 2024-12-10 RX ADMIN — NICOTINE 1 PATCH: 21 PATCH, EXTENDED RELEASE TRANSDERMAL at 07:42

## 2024-12-10 RX ADMIN — Medication 1: at 12:33

## 2024-12-10 RX ADMIN — LORAZEPAM 2 MILLIGRAM(S): 2 TABLET ORAL at 23:31

## 2024-12-10 RX ADMIN — LORAZEPAM 2 MILLIGRAM(S): 2 TABLET ORAL at 02:23

## 2024-12-10 RX ADMIN — NICOTINE 1 PATCH: 21 PATCH, EXTENDED RELEASE TRANSDERMAL at 14:07

## 2024-12-10 RX ADMIN — NICOTINE 1 PATCH: 21 PATCH, EXTENDED RELEASE TRANSDERMAL at 12:42

## 2024-12-10 RX ADMIN — ACETAMINOPHEN 500MG 975 MILLIGRAM(S): 500 TABLET, COATED ORAL at 15:12

## 2024-12-10 RX ADMIN — ENOXAPARIN SODIUM 40 MILLIGRAM(S): 30 INJECTION SUBCUTANEOUS at 05:16

## 2024-12-10 RX ADMIN — Medication 100 MILLIGRAM(S): at 18:25

## 2024-12-10 NOTE — CHART NOTE - NSCHARTNOTEFT_GEN_A_CORE
CIWA  HPI:  64 year old male with hx of T2DM, CAD s/p CABG, HTN, HLD, transferred from [facility] for further evaluation after mechanical fall down stairs. The previous night, patient experienced a fall on stairs after slipping. Pt was wearing slippers and was turning around to close the door when he slipped. He fell down about 5-6 steps. He felt he may have passed out but is unsure. When he came to, he was at the bottom of the stairs and felt pain in his head, L arm, and L lateral chest.  Patient was evaluated at [facility] with CT imaging without any obvious traumatic injury.  Transferred for concern for spinal cord pathology due to left lower extremity numbness and weakness. He denies any incontinence, saddle anesthesia. Currently not endorsing any weakness or sensory changes. In the ED, patient was evaluated by trauma surgery and ortho-spine. MRI spine notable for edema in vertebral bodies of C7 and T1 concerning for compression fx. No evidence of spinal cord compression. Pt maintained in C collar. Pt was also noted to be tachycardic and  tremulous raising concern for alcohol withdrawal so was given valium in ED. Pt says he was not drinking around time of fall. BAL normal in ED. Pt admitted for further management, now with worsening withdrawal symptoms. CIWA scores fluctating 3-9. HR remaining > 100s.     Vital Signs Last 24 Hrs  T(C): 36.1 (10 Dec 2024 09:30), Max: 36.8 (10 Dec 2024 05:33)  T(F): 97 (10 Dec 2024 09:30), Max: 98.2 (10 Dec 2024 05:33)  HR: 136 (10 Dec 2024 09:30) (92 - 148)  BP: 156/94 (10 Dec 2024 09:30) (127/97 - 156/110)  BP(mean): --  RR: 18 (10 Dec 2024 09:30) (18 - 19)  SpO2: 95% (10 Dec 2024 09:30) (90% - 98%)    Parameters below as of 10 Dec 2024 09:30  Patient On (Oxygen Delivery Method): room air    12-10    133[L]  |  98  |  14  ----------------------------<  147[H]  3.8   |  21[L]  |  0.76    Ca    9.6      10 Dec 2024 07:01  Phos  2.0     12-10  Mg     2.0     12-10    PHYSICAL EXAM:  GENERAL: NAD, elderly  NEURO: A&O 1-2   HEAD:  Atraumatic, normocephalic  EYES: EOMI, conjunctiva and sclera clear  NECK: C collar in place   CHEST/LUNG: Clear to auscultation bilaterally; no wheezing or rales  HEART: Regular rate and rhythm; no murmurs, no LE edema   ABDOMEN: Soft, nontender, nondistended; bowel sounds present  EXTREMITIES:  2+ Peripheral Pulses, no clubbing, cyanosis  PSYCH: alert, awake, calm affect, not anxious  SKIN: No rashes or lesions      A/P: 64 year old male with hx of T2DM, CAD s/p CABG, HTN, HLD, transferred from [facility] for further evaluation after mechanical fall down stairs. The previous night, patient experienced a fall on stairs after slipping. In the ED, patient was evaluated by trauma surgery and ortho-spine. MRI spine notable for edema in vertebral bodies of C7 and T1 concerning for compression fx. No evidence of spinal cord compression. Pt maintained in C collar. Pt was also noted to be tachycardic and  tremulous raising concern for alcohol withdrawal so was given valium in ED. Pt says he was not drinking around time of fall. BAL normal in ED. Pt admitted for further management, now with worsening withdrawal symptoms. CIWA scores fluctating 3-9. HR remaining > 100s.     - Discussed with Dr. Lima will start on Ativan IV taper and continue to monitor withdrawal symptoms.   - Remain on 1:1 for safety.   - WIll continue to monitor CIWA scores and symptoms.   - Low threshold for MICU cx if CIWA scores remain elevated despite change to Ativan taper.

## 2024-12-10 NOTE — PROGRESS NOTE ADULT - SUBJECTIVE AND OBJECTIVE BOX
[facility]  Christianne Lima MD  Available via MS Teams    SUBJECTIVE / OVERNIGHT EVENTS:  CIWAs elevated overnight, s/p 2 doses of extra IV ativan   calm this AM, but delirious, not oriented to place or situation, unable to obtain reliable ROS     ADDITIONAL REVIEW OF SYSTEMS:  as noted above     MEDICATIONS  (STANDING):  aspirin enteric coated 81 milliGRAM(s) Oral daily  atorvastatin 40 milliGRAM(s) Oral at bedtime  clopidogrel Tablet 75 milliGRAM(s) Oral daily  dextrose 5%. 1000 milliLiter(s) (50 mL/Hr) IV Continuous <Continuous>  dextrose 5%. 1000 milliLiter(s) (100 mL/Hr) IV Continuous <Continuous>  dextrose 50% Injectable 25 Gram(s) IV Push once  dextrose 50% Injectable 12.5 Gram(s) IV Push once  dextrose 50% Injectable 25 Gram(s) IV Push once  enoxaparin Injectable 40 milliGRAM(s) SubCutaneous every 24 hours  glucagon  Injectable 1 milliGRAM(s) IntraMuscular once  insulin lispro (ADMELOG) corrective regimen sliding scale   SubCutaneous three times a day before meals  LORazepam   Injectable   IV Push   LORazepam   Injectable 2 milliGRAM(s) IV Push every 4 hours  losartan 100 milliGRAM(s) Oral daily  nicotine - 21 mG/24Hr(s) Patch 1 Patch Transdermal daily  pantoprazole    Tablet 40 milliGRAM(s) Oral before breakfast  sodium chloride 0.9%. 1000 milliLiter(s) (100 mL/Hr) IV Continuous <Continuous>    MEDICATIONS  (PRN):  acetaminophen     Tablet .. 975 milliGRAM(s) Oral every 6 hours PRN Temp greater or equal to 38C (100.4F), Moderate Pain (4 - 6)  dextrose Oral Gel 15 Gram(s) Oral once PRN Blood Glucose LESS THAN 70 milliGRAM(s)/deciliter  LORazepam   Injectable 2 milliGRAM(s) IV Push every 2 hours PRN CIWA-Ar score increase by 2 points and a total score of 7 or less  LORazepam   Injectable 2 milliGRAM(s) IV Push every 1 hour PRN CIWA-Ar score 8 or greater      I&O's Summary    09 Dec 2024 07:01  -  10 Dec 2024 07:00  --------------------------------------------------------  IN: 0 mL / OUT: 300 mL / NET: -300 mL        PHYSICAL EXAM:  Vital Signs Last 24 Hrs  T(C): 36.6 (10 Dec 2024 12:30), Max: 36.8 (10 Dec 2024 05:33)  T(F): 97.9 (10 Dec 2024 12:30), Max: 98.2 (10 Dec 2024 05:33)  HR: 121 (10 Dec 2024 12:30) (92 - 148)  BP: 132/96 (10 Dec 2024 12:30) (114/85 - 156/110)  BP(mean): --  RR: 18 (10 Dec 2024 12:30) (18 - 19)  SpO2: 96% (10 Dec 2024 12:30) (90% - 98%)    Parameters below as of 10 Dec 2024 12:30  Patient On (Oxygen Delivery Method): room air      PHYSICAL EXAM:  GENERAL: NAD, elderly  HEAD:  Atraumatic, normocephalic  EYES: EOMI, conjunctiva and sclera clear  NECK: C collar in place   CHEST/LUNG: Clear to auscultation bilaterally; no wheezing or rales  HEART: Regular rate and rhythm; no murmurs, no LE edema   ABDOMEN: Soft, nontender, nondistended; bowel sounds present  EXTREMITIES:  2+ Peripheral Pulses, no clubbing, cyanosis  PSYCH: awake, alert, oriented x 1, calm affect, not anxious  SKIN: No rashes or lesions      LABS:    12-10    133[L]  |  98  |  14  ----------------------------<  147[H]  3.8   |  21[L]  |  0.76    Ca    9.6      10 Dec 2024 07:01  Phos  2.0     12-10  Mg     2.0     12-10            Urinalysis Basic - ( 10 Dec 2024 07:01 )    Color: x / Appearance: x / SG: x / pH: x  Gluc: 147 mg/dL / Ketone: x  / Bili: x / Urobili: x   Blood: x / Protein: x / Nitrite: x   Leuk Esterase: x / RBC: x / WBC x   Sq Epi: x / Non Sq Epi: x / Bacteria: x            RADIOLOGY & ADDITIONAL TESTS:  New Imaging Personally Reviewed Today:  New Electrocardiogram Personally Reviewed Today:  Other Results Reviewed Today:   Prior or Outpatient Records Reviewed Today with Summary:    COORDINATION OF CARE:  Consultant Communication and Details of Discussion (where applicable):

## 2024-12-10 NOTE — SWALLOW BEDSIDE ASSESSMENT ADULT - COMMENTS
cont: IMAGING: MRI Head: No MRI evidence of acute intracranial pathology. *Pt is new to this service.

## 2024-12-10 NOTE — SWALLOW BEDSIDE ASSESSMENT ADULT - ASR SWALLOW ASPIRATION MONITOR
change of breathing pattern/position upright (90Y)/cough/gurgly voice/fever/pneumonia/throat clearing

## 2024-12-10 NOTE — OCCUPATIONAL THERAPY INITIAL EVALUATION ADULT - PERTINENT HX OF CURRENT PROBLEM, REHAB EVAL
64 year old male with hx of T2DM, CAD s/p CABG, HTN, HLD, transferred from [facility] for further evaluation after mechanical fall down stairs with concerns for spinal cord injury. MRI spine here without cord compression but noted to have possible vertebral compression fracture of C7, T1. Pt admitted for further workup and management. MRI head (12/7) No MRI evidence of acute intracranial pathology. MRI acute spinal cord compression (12/7) There is edema in the C7 and T1 vertebral bodies, suspicious for acute   mild compression fractures.  There is edema within anterior vertebral marginal osteophyte at C7-T1, suspicious for fracture of the osteophyte.  There is mild prevertebral edema at T1-T2. No evidence of cord compression, cord edema or intrinsic T2 cord signal abnormality. There is an abnormal flow void in the right vertebral artery.  This may be due to small vessel caliber and slow flow,  however right vertebral   artery dissection/blunt cerebrovascular injury is not excluded.

## 2024-12-10 NOTE — OCCUPATIONAL THERAPY INITIAL EVALUATION ADULT - ADL RETRAINING, OT EVAL
GOAL: Pt will perform LB dressing independently in 4 weeks GOAL: Pt will be independent with toileting in 4 weeks .

## 2024-12-10 NOTE — SWALLOW BEDSIDE ASSESSMENT ADULT - SWALLOW EVAL: DIAGNOSIS
Patient is a 64 year old male with hx of T2DM, CAD s/p CABG, HTN transferred from [facility] for further evaluation after mechanical fall down stairs with concerns for spinal cord injury. Patient presents with an overtly functional oropharyngeal swallow. Slow mastication i/s/o C-collar, however functional. No skilled ST needs are warranted.

## 2024-12-10 NOTE — SWALLOW BEDSIDE ASSESSMENT ADULT - SLP GENERAL OBSERVATIONS
Patient received sitting upright at edge of bed; 1:1 present; +C-collar; AAOx1 with confusion; able to follow commands for the purpose of this evaluation.

## 2024-12-10 NOTE — SWALLOW BEDSIDE ASSESSMENT ADULT - SLP PERTINENT HISTORY OF CURRENT PROBLEM
Pt is a 64 year old M with hx of T2DM, CAD s/p CABG, HTN, HLD, transferred from [facility] for further evaluation after mechanical fall down stairs with concerns for spinal cord injury. MRI spine here without cord compression but noted to have possible vertebral compression fracture of C7, T1; showing abnormal flow void in the right vertebral artery, cannot exclude vertebral artery dissection given fall and compression fractures in the area -  CTA head/neck pending to eval further. Pt maintained in C collar. Pt was also noted to be tachycardic and tremulous raising concern for alcohol withdrawal so was given valium in ED. Pt says he was not drinking around time of fall. BAL normal in ED. Pt admitted for further workup and management. Per H&P, pt does not remember his medications and dosages. His wife is away and he has no one who can bring his medication list. JOSE ALFREDO elevated this AM (12/09) with confusion and ?hallucinations. Pt is a 64 year old M with hx of T2DM, CAD s/p CABG, HTN, HLD, transferred from [facility] for further evaluation after mechanical fall down stairs with concerns for spinal cord injury. MRI spine here without cord compression but noted to have possible vertebral compression fracture of C7, T1; showing abnormal flow void in the right vertebral artery, cannot exclude vertebral artery dissection given fall and compression fractures in the area -  CTA head/neck pending to eval further. Pt maintained in C collar. Pt was also noted to be tachycardic and tremulous raising concern for alcohol withdrawal so was given valium in ED. Pt says he was not drinking around time of fall. BAL normal in ED. Pt admitted for further workup and management. CIWA elevated this AM (12/09) with confusion and ?hallucinations.

## 2024-12-11 LAB
ANION GAP SERPL CALC-SCNC: 13 MMOL/L — SIGNIFICANT CHANGE UP (ref 5–17)
BUN SERPL-MCNC: 17 MG/DL — SIGNIFICANT CHANGE UP (ref 7–23)
CALCIUM SERPL-MCNC: 9.2 MG/DL — SIGNIFICANT CHANGE UP (ref 8.4–10.5)
CHLORIDE SERPL-SCNC: 105 MMOL/L — SIGNIFICANT CHANGE UP (ref 96–108)
CO2 SERPL-SCNC: 19 MMOL/L — LOW (ref 22–31)
CREAT SERPL-MCNC: 0.89 MG/DL — SIGNIFICANT CHANGE UP (ref 0.5–1.3)
EGFR: 96 ML/MIN/1.73M2 — SIGNIFICANT CHANGE UP
GLUCOSE BLDC GLUCOMTR-MCNC: 137 MG/DL — HIGH (ref 70–99)
GLUCOSE BLDC GLUCOMTR-MCNC: 141 MG/DL — HIGH (ref 70–99)
GLUCOSE BLDC GLUCOMTR-MCNC: 148 MG/DL — HIGH (ref 70–99)
GLUCOSE BLDC GLUCOMTR-MCNC: 148 MG/DL — HIGH (ref 70–99)
GLUCOSE SERPL-MCNC: 147 MG/DL — HIGH (ref 70–99)
MAGNESIUM SERPL-MCNC: 1.8 MG/DL — SIGNIFICANT CHANGE UP (ref 1.6–2.6)
PHOSPHATE SERPL-MCNC: 4.1 MG/DL — SIGNIFICANT CHANGE UP (ref 2.5–4.5)
POTASSIUM SERPL-MCNC: 4 MMOL/L — SIGNIFICANT CHANGE UP (ref 3.5–5.3)
POTASSIUM SERPL-SCNC: 4 MMOL/L — SIGNIFICANT CHANGE UP (ref 3.5–5.3)
SODIUM SERPL-SCNC: 137 MMOL/L — SIGNIFICANT CHANGE UP (ref 135–145)

## 2024-12-11 PROCEDURE — 99232 SBSQ HOSP IP/OBS MODERATE 35: CPT

## 2024-12-11 RX ORDER — SODIUM CHLORIDE 9 MG/ML
1000 INJECTION, SOLUTION INTRAMUSCULAR; INTRAVENOUS; SUBCUTANEOUS
Refills: 0 | Status: DISCONTINUED | OUTPATIENT
Start: 2024-12-11 | End: 2024-12-13

## 2024-12-11 RX ADMIN — LOSARTAN POTASSIUM 100 MILLIGRAM(S): 100 TABLET, FILM COATED ORAL at 05:43

## 2024-12-11 RX ADMIN — NICOTINE 1 PATCH: 21 PATCH, EXTENDED RELEASE TRANSDERMAL at 11:56

## 2024-12-11 RX ADMIN — SODIUM CHLORIDE 100 MILLILITER(S): 9 INJECTION, SOLUTION INTRAMUSCULAR; INTRAVENOUS; SUBCUTANEOUS at 08:56

## 2024-12-11 RX ADMIN — Medication 40 MILLIGRAM(S): at 21:36

## 2024-12-11 RX ADMIN — ACETAMINOPHEN 500MG 975 MILLIGRAM(S): 500 TABLET, COATED ORAL at 17:45

## 2024-12-11 RX ADMIN — LORAZEPAM 2 MILLIGRAM(S): 2 TABLET ORAL at 08:49

## 2024-12-11 RX ADMIN — LORAZEPAM 2 MILLIGRAM(S): 2 TABLET ORAL at 03:52

## 2024-12-11 RX ADMIN — Medication 100 MILLIGRAM(S): at 11:41

## 2024-12-11 RX ADMIN — ACETAMINOPHEN 500MG 975 MILLIGRAM(S): 500 TABLET, COATED ORAL at 05:46

## 2024-12-11 RX ADMIN — LORAZEPAM 1.5 MILLIGRAM(S): 2 TABLET ORAL at 17:22

## 2024-12-11 RX ADMIN — ACETAMINOPHEN 500MG 975 MILLIGRAM(S): 500 TABLET, COATED ORAL at 06:30

## 2024-12-11 RX ADMIN — Medication 81 MILLIGRAM(S): at 11:40

## 2024-12-11 RX ADMIN — Medication 1 MILLIGRAM(S): at 11:40

## 2024-12-11 RX ADMIN — LORAZEPAM 1.5 MILLIGRAM(S): 2 TABLET ORAL at 21:37

## 2024-12-11 RX ADMIN — PANTOPRAZOLE SODIUM 40 MILLIGRAM(S): 40 TABLET, DELAYED RELEASE ORAL at 05:43

## 2024-12-11 RX ADMIN — Medication 1 TABLET(S): at 11:41

## 2024-12-11 RX ADMIN — NICOTINE 1 PATCH: 21 PATCH, EXTENDED RELEASE TRANSDERMAL at 05:40

## 2024-12-11 RX ADMIN — CLOPIDOGREL 75 MILLIGRAM(S): 75 TABLET, FILM COATED ORAL at 11:41

## 2024-12-11 RX ADMIN — NICOTINE 1 PATCH: 21 PATCH, EXTENDED RELEASE TRANSDERMAL at 11:42

## 2024-12-11 RX ADMIN — ACETAMINOPHEN 500MG 975 MILLIGRAM(S): 500 TABLET, COATED ORAL at 23:27

## 2024-12-11 RX ADMIN — ENOXAPARIN SODIUM 40 MILLIGRAM(S): 30 INJECTION SUBCUTANEOUS at 05:43

## 2024-12-11 NOTE — PROGRESS NOTE ADULT - SUBJECTIVE AND OBJECTIVE BOX
Patient seen for rehab follow up  CC:  fall    patient complains of feeling tired, doesn't sleep well at night  denies back pain   on 1:1    REVIEW OF SYSTEMS  +neck pain, headache     FUNCTIONAL PROGRESS  SLP 12/10  functional swallow  regular/thin    PT 12/11  bed mobility supervision   transfers CG with RW   gait CG with RW x 75 feet   seated rest break, due to fatigue    OT 12/10  bed mobility supervision/CG  transfers CG full weight bearing   dressing mod assist     VITALS  T(C): 36.4 (12-11-24 @ 11:31), Max: 37.2 (12-10-24 @ 23:30)  HR: 93 (12-11-24 @ 11:31) (82 - 108)  BP: 114/81 (12-11-24 @ 11:31) (114/81 - 134/98)  RR: 18 (12-11-24 @ 11:31) (18 - 18)  SpO2: 98% (12-11-24 @ 11:31) (96% - 98%)  Wt(kg): --    MEDICATIONS   acetaminophen     Tablet .. 975 milliGRAM(s) every 6 hours PRN  aspirin enteric coated 81 milliGRAM(s) daily  atorvastatin 40 milliGRAM(s) at bedtime  clopidogrel Tablet 75 milliGRAM(s) daily  dextrose 5%. 1000 milliLiter(s) <Continuous>  dextrose 5%. 1000 milliLiter(s) <Continuous>  dextrose 50% Injectable 25 Gram(s) once  dextrose 50% Injectable 12.5 Gram(s) once  dextrose 50% Injectable 25 Gram(s) once  dextrose Oral Gel 15 Gram(s) once PRN  enoxaparin Injectable 40 milliGRAM(s) every 24 hours  folic acid 1 milliGRAM(s) daily  glucagon  Injectable 1 milliGRAM(s) once  insulin lispro (ADMELOG) corrective regimen sliding scale   three times a day before meals  LORazepam   Injectable     LORazepam   Injectable 1.5 milliGRAM(s) every 4 hours  LORazepam   Injectable 2 milliGRAM(s) every 2 hours PRN  LORazepam   Injectable 2 milliGRAM(s) every 1 hour PRN  losartan 100 milliGRAM(s) daily  multivitamin 1 Tablet(s) daily  nicotine - 21 mG/24Hr(s) Patch 1 Patch daily  pantoprazole    Tablet 40 milliGRAM(s) before breakfast  sodium chloride 0.9%. 1000 milliLiter(s) <Continuous>  thiamine 100 milliGRAM(s) daily      RECENT LABS - Reviewed    12-11    137  |  105  |  17  ----------------------------<  147[H]  4.0   |  19[L]  |  0.89    Ca    9.2      11 Dec 2024 06:40  Phos  4.1     12-11  Mg     1.8     12-11        Urinalysis Basic - ( 11 Dec 2024 06:40 )    Color: x / Appearance: x / SG: x / pH: x  Gluc: 147 mg/dL / Ketone: x  / Bili: x / Urobili: x   Blood: x / Protein: x / Nitrite: x   Leuk Esterase: x / RBC: x / WBC x   Sq Epi: x / Non Sq Epi: x / Bacteria: x        IMPRESSION:    MRI CERVICAL SPINE:  Bony details are better evaluated onCT scan.    There is edema in the C7 and T1 vertebral bodies, suspicious for acute   mild compression fractures.  There is edema within anterior vertebral   marginal osteophyte at C7-T1, suspicious for fracture of the osteophyte.    There is mild prevertebral edema at T1-T2.    No evidence of traumatic malalignment, instability or ligamentous injury.    No evidence of cord compression, cord edema or intrinsic T2 cord signal   abnormality.    There is an abnormal flow void in the right vertebral artery.  This may   be due to small vessel caliber and slow flow,  however right vertebral   artery dissection/blunt cerebrovascular injury is not excluded.    Follow-up CTA brain and/or MRI brain and MRA neck and brain with   precontrast fat-saturatedT1-weighted images is recommended.    There is small amount of fluid in the left C1-C2 facet joint trace fluid   in the right C1-C2 facet joint without evidence of malalignment or   ligamentous injury at C1-C2.    A 7 mm T2 hyperintense focus in the dens probably represents a   degenerative cyst.        MRI THORACIC SPINE:  Bony details limited better evaluated on CT scan.    Redemonstration of fractures at C7-T1.    The remainder of the thoracic spine is intact.    No evidence of thoracic cord compression, cord edema or intrinsic T2 cord   signal abnormality.    MRI LUMBAR SPINE:  Bony details are better evaluated on CT scan.    There is approximately 1.2 cm T1 hypointense focus in the superior   endplate of T1 with associated T2 signal hyperintensity on STIR images;   this is of uncertain etiology and clinical significance but may represent   an acute Schmorl's node.  CT and/or dedicated lumbar spine MRI with   postcontrast imaging may be obtained for further evaluation.  Otherwise,   there is no compelling evidence for acute lumbar spine fracture.      < end of copied text >    < from: MR Head No Cont (12.07.24 @ 20:15) >      IMPRESSION:  No MRI evidence of acute intracranial pathology.    There are two punctate chronic microhemorrhages in the right frontal lobe   and a chronic microhemorrhage in the left.  Frontal white matter.  No   evidence of a lobar hemorrhage..    Patchy mild microvascular type changes in the periventricular and deep   white matter.    Complete versus near complete opacification of the left maxillary sinus   with mucosal thickening, intrasinus fluid and a punctate focus of   susceptibility that may represent gas or calcified inspissated   secretions.  Acute sinusitis should be excluded clinically.    Small mastoid effusions bilaterally.  Correlate clinically for sterile   effusion versus acute otitis media.        < end of copied text >      ---------------------------------------------------------------------------------  PHYSICAL EXAM  Constitutional - NAD, Comfortable, laying in bed   Neck - cervical collar   Chest - Breathing comfortably on room air   Cardiovascular - S1S2   Extremities - No C/C/E, No calf tenderness   Neurologic Exam -   follows commands                 Cognitive - Awake, Alert, AAO to self, place, date, year, situation     Communication - Fluent, No dysarthria        Motor -moves all ext      Sensory - Intact to LT     Psychiatric - Mood stable, Affect WNL  ----------------------------------------------------------------------------------------  ASSESSMENT/PLAN  64yMale h/o DM, HTN, CAD with functional deficits after fall  MRI brain with no acute findings   MRI spine with C7, T1 compression fractures, no acute surgical intervention  ETOH on [facility], 1:1 observation   Pain - Tylenol  DVT PPX - SCDs lovenox   Rehab -    patient required CG with mobility   continue bedside therapy while admitted to prevent secondary complications of immobility, bed mobility, transfer training, progressive ambulation, equipment evaluation, ADLs   OOB throughout the day with staff, OOB to chair with meals/3 hours daily       recommend [facility] for continued inpatient rehabilitation, his change in mobility seems to be more due to decreased sleep/withdrawal than his injury   add melatonin for sleep    lives alone, but he prefers discharge to home with outpatient follow up      Will continue to follow for ongoing rehab needs and recommendations.               35 minutes spent on total encounter  with chart review of PT/OT/SLP notes, exam, imaging, counseling and education on inpatient rehabilitation, coordination of care with rehab team and

## 2024-12-11 NOTE — PROGRESS NOTE ADULT - SUBJECTIVE AND OBJECTIVE BOX
[facility] Division of Hospital Medicine  Christianne Lima MD  Available via MS Teams    SUBJECTIVE / OVERNIGHT EVENTS:  no acute events   pt sleeping deeply on exam, wakes briefly, denies complaints   CIWAs generally better overnight     ADDITIONAL REVIEW OF SYSTEMS:  14 point ROS negative except as noted above     MEDICATIONS  (STANDING):  aspirin enteric coated 81 milliGRAM(s) Oral daily  atorvastatin 40 milliGRAM(s) Oral at bedtime  clopidogrel Tablet 75 milliGRAM(s) Oral daily  dextrose 5%. 1000 milliLiter(s) (50 mL/Hr) IV Continuous <Continuous>  dextrose 5%. 1000 milliLiter(s) (100 mL/Hr) IV Continuous <Continuous>  dextrose 50% Injectable 25 Gram(s) IV Push once  dextrose 50% Injectable 12.5 Gram(s) IV Push once  dextrose 50% Injectable 25 Gram(s) IV Push once  enoxaparin Injectable 40 milliGRAM(s) SubCutaneous every 24 hours  folic acid 1 milliGRAM(s) Oral daily  glucagon  Injectable 1 milliGRAM(s) IntraMuscular once  insulin lispro (ADMELOG) corrective regimen sliding scale   SubCutaneous three times a day before meals  LORazepam   Injectable   IV Push   LORazepam   Injectable 1.5 milliGRAM(s) IV Push every 4 hours  losartan 100 milliGRAM(s) Oral daily  multivitamin 1 Tablet(s) Oral daily  nicotine - 21 mG/24Hr(s) Patch 1 Patch Transdermal daily  pantoprazole    Tablet 40 milliGRAM(s) Oral before breakfast  sodium chloride 0.9%. 1000 milliLiter(s) (100 mL/Hr) IV Continuous <Continuous>  thiamine 100 milliGRAM(s) Oral daily    MEDICATIONS  (PRN):  acetaminophen     Tablet .. 975 milliGRAM(s) Oral every 6 hours PRN Temp greater or equal to 38C (100.4F), Moderate Pain (4 - 6)  dextrose Oral Gel 15 Gram(s) Oral once PRN Blood Glucose LESS THAN 70 milliGRAM(s)/deciliter  LORazepam   Injectable 2 milliGRAM(s) IV Push every 2 hours PRN CIWA-Ar score increase by 2 points and a total score of 7 or less  LORazepam   Injectable 2 milliGRAM(s) IV Push every 1 hour PRN CIWA-Ar score 8 or greater      I&O's Summary      PHYSICAL EXAM:  Vital Signs Last 24 Hrs  T(C): 36.4 (11 Dec 2024 11:31), Max: 37.2 (10 Dec 2024 23:30)  T(F): 97.5 (11 Dec 2024 11:31), Max: 99 (10 Dec 2024 23:30)  HR: 93 (11 Dec 2024 11:31) (82 - 108)  BP: 114/81 (11 Dec 2024 11:31) (94/69 - 134/98)  BP(mean): --  RR: 18 (11 Dec 2024 11:31) (18 - 18)  SpO2: 98% (11 Dec 2024 11:31) (94% - 98%)    Parameters below as of 11 Dec 2024 11:31  Patient On (Oxygen Delivery Method): room air      PHYSICAL EXAM:  GENERAL: NAD, elderly   HEAD:  Atraumatic, normocephalic  EYES: EOMI, conjunctiva and sclera clear  NECK: +C collar in place   CHEST/LUNG: Clear to auscultation bilaterally; no wheezing or rales  HEART: Regular rate and rhythm; no murmurs, no LE edema   ABDOMEN: Soft, nontender, nondistended; bowel sounds present  EXTREMITIES:  2+ Peripheral Pulses, no clubbing, cyanosis  PSYCH: awakes, but sleepy today, calm affect, not anxious  SKIN: No rashes or lesions      LABS:    12-11    137  |  105  |  17  ----------------------------<  147[H]  4.0   |  19[L]  |  0.89    Ca    9.2      11 Dec 2024 06:40  Phos  4.1     12-11  Mg     1.8     12-11            Urinalysis Basic - ( 11 Dec 2024 06:40 )    Color: x / Appearance: x / SG: x / pH: x  Gluc: 147 mg/dL / Ketone: x  / Bili: x / Urobili: x   Blood: x / Protein: x / Nitrite: x   Leuk Esterase: x / RBC: x / WBC x   Sq Epi: x / Non Sq Epi: x / Bacteria: x            RADIOLOGY & ADDITIONAL TESTS:  New Imaging Personally Reviewed Today:  New Electrocardiogram Personally Reviewed Today:  Other Results Reviewed Today:   Prior or Outpatient Records Reviewed Today with Summary:    COORDINATION OF CARE:  Consultant Communication and Details of Discussion (where applicable):

## 2024-12-12 DIAGNOSIS — R07.9 CHEST PAIN, UNSPECIFIED: ICD-10-CM

## 2024-12-12 LAB
ADD ON TEST-SPECIMEN IN LAB: SIGNIFICANT CHANGE UP
ADD ON TEST-SPECIMEN IN LAB: SIGNIFICANT CHANGE UP
ANION GAP SERPL CALC-SCNC: 14 MMOL/L — SIGNIFICANT CHANGE UP (ref 5–17)
BUN SERPL-MCNC: 14 MG/DL — SIGNIFICANT CHANGE UP (ref 7–23)
CALCIUM SERPL-MCNC: 9.1 MG/DL — SIGNIFICANT CHANGE UP (ref 8.4–10.5)
CHLORIDE SERPL-SCNC: 102 MMOL/L — SIGNIFICANT CHANGE UP (ref 96–108)
CK MB BLD-MCNC: 5.9 % — HIGH (ref 0–3.5)
CK MB CFR SERPL CALC: 1.3 NG/ML — SIGNIFICANT CHANGE UP (ref 0–6.7)
CK SERPL-CCNC: 22 U/L — LOW (ref 30–200)
CO2 SERPL-SCNC: 20 MMOL/L — LOW (ref 22–31)
CREAT SERPL-MCNC: 0.74 MG/DL — SIGNIFICANT CHANGE UP (ref 0.5–1.3)
EGFR: 101 ML/MIN/1.73M2 — SIGNIFICANT CHANGE UP
GLUCOSE BLDC GLUCOMTR-MCNC: 138 MG/DL — HIGH (ref 70–99)
GLUCOSE BLDC GLUCOMTR-MCNC: 144 MG/DL — HIGH (ref 70–99)
GLUCOSE BLDC GLUCOMTR-MCNC: 148 MG/DL — HIGH (ref 70–99)
GLUCOSE BLDC GLUCOMTR-MCNC: 176 MG/DL — HIGH (ref 70–99)
GLUCOSE SERPL-MCNC: 131 MG/DL — HIGH (ref 70–99)
MAGNESIUM SERPL-MCNC: 1.7 MG/DL — SIGNIFICANT CHANGE UP (ref 1.6–2.6)
PHOSPHATE SERPL-MCNC: 3.4 MG/DL — SIGNIFICANT CHANGE UP (ref 2.5–4.5)
POTASSIUM SERPL-MCNC: 3.6 MMOL/L — SIGNIFICANT CHANGE UP (ref 3.5–5.3)
POTASSIUM SERPL-SCNC: 3.6 MMOL/L — SIGNIFICANT CHANGE UP (ref 3.5–5.3)
SODIUM SERPL-SCNC: 136 MMOL/L — SIGNIFICANT CHANGE UP (ref 135–145)
TROPONIN T, HIGH SENSITIVITY RESULT: <6 NG/L — SIGNIFICANT CHANGE UP (ref 0–51)

## 2024-12-12 PROCEDURE — 99232 SBSQ HOSP IP/OBS MODERATE 35: CPT

## 2024-12-12 RX ORDER — TRAMADOL HYDROCHLORIDE 300 MG/1
25 CAPSULE ORAL EVERY 8 HOURS
Refills: 0 | Status: DISCONTINUED | OUTPATIENT
Start: 2024-12-12 | End: 2024-12-16

## 2024-12-12 RX ADMIN — ACETAMINOPHEN 500MG 975 MILLIGRAM(S): 500 TABLET, COATED ORAL at 00:27

## 2024-12-12 RX ADMIN — LORAZEPAM 1 MILLIGRAM(S): 2 TABLET ORAL at 18:55

## 2024-12-12 RX ADMIN — CLOPIDOGREL 75 MILLIGRAM(S): 75 TABLET, FILM COATED ORAL at 11:29

## 2024-12-12 RX ADMIN — LORAZEPAM 1 MILLIGRAM(S): 2 TABLET ORAL at 21:14

## 2024-12-12 RX ADMIN — ENOXAPARIN SODIUM 40 MILLIGRAM(S): 30 INJECTION SUBCUTANEOUS at 06:44

## 2024-12-12 RX ADMIN — NICOTINE 1 PATCH: 21 PATCH, EXTENDED RELEASE TRANSDERMAL at 06:56

## 2024-12-12 RX ADMIN — LORAZEPAM 1.5 MILLIGRAM(S): 2 TABLET ORAL at 06:41

## 2024-12-12 RX ADMIN — PANTOPRAZOLE SODIUM 40 MILLIGRAM(S): 40 TABLET, DELAYED RELEASE ORAL at 06:41

## 2024-12-12 RX ADMIN — Medication 100 MILLIGRAM(S): at 11:29

## 2024-12-12 RX ADMIN — NICOTINE 1 PATCH: 21 PATCH, EXTENDED RELEASE TRANSDERMAL at 11:25

## 2024-12-12 RX ADMIN — LOSARTAN POTASSIUM 100 MILLIGRAM(S): 100 TABLET, FILM COATED ORAL at 06:41

## 2024-12-12 RX ADMIN — LORAZEPAM 1.5 MILLIGRAM(S): 2 TABLET ORAL at 03:02

## 2024-12-12 RX ADMIN — NICOTINE 1 PATCH: 21 PATCH, EXTENDED RELEASE TRANSDERMAL at 11:28

## 2024-12-12 RX ADMIN — Medication 81 MILLIGRAM(S): at 11:29

## 2024-12-12 RX ADMIN — Medication 1 MILLIGRAM(S): at 11:29

## 2024-12-12 RX ADMIN — Medication 40 MILLIGRAM(S): at 21:14

## 2024-12-12 RX ADMIN — LORAZEPAM 1 MILLIGRAM(S): 2 TABLET ORAL at 15:03

## 2024-12-12 RX ADMIN — Medication 1 TABLET(S): at 11:29

## 2024-12-12 RX ADMIN — LORAZEPAM 1 MILLIGRAM(S): 2 TABLET ORAL at 11:29

## 2024-12-12 RX ADMIN — NICOTINE 1 PATCH: 21 PATCH, EXTENDED RELEASE TRANSDERMAL at 19:35

## 2024-12-12 NOTE — PROGRESS NOTE ADULT - PROBLEM SELECTOR PLAN 8
DVT ppx - lovenox subq  Diet - DASH CC  Dispo - PM+R reccs [facility], which pt is agreeable with - anticipate pt will be medically ready in 2 days

## 2024-12-12 NOTE — PROGRESS NOTE ADULT - SUBJECTIVE AND OBJECTIVE BOX
Patient seen for rehab follow up  CC:  fall    patient on 1:1  sat in chair yesterday, has some neck, lower back pain  asking for pain medications  left leg feels "weak" when standing      REVIEW OF SYSTEMS  Constitutional - No fever,  No fatigue  HEENT - No vertigo  Neurological - No headaches, No memory loss  Psychiatric - No depression, No anxiety    FUNCTIONAL PROGRESS  PT 12/11  bed mobility supervision   transfers CG with RW   gait CG with RW x 75 feet  seated rest break    VITALS  T(C): 36.6 (12-12-24 @ 08:10), Max: 37 (12-11-24 @ 23:42)  HR: 95 (12-12-24 @ 08:10) (91 - 105)  BP: 120/87 (12-12-24 @ 08:10) (114/81 - 157/94)  RR: 18 (12-12-24 @ 08:10) (18 - 18)  SpO2: 96% (12-12-24 @ 08:10) (96% - 98%)  Wt(kg): --    MEDICATIONS   acetaminophen     Tablet .. 975 milliGRAM(s) every 6 hours PRN  aspirin enteric coated 81 milliGRAM(s) daily  atorvastatin 40 milliGRAM(s) at bedtime  clopidogrel Tablet 75 milliGRAM(s) daily  dextrose 5%. 1000 milliLiter(s) <Continuous>  dextrose 5%. 1000 milliLiter(s) <Continuous>  dextrose 50% Injectable 25 Gram(s) once  dextrose 50% Injectable 12.5 Gram(s) once  dextrose 50% Injectable 25 Gram(s) once  dextrose Oral Gel 15 Gram(s) once PRN  enoxaparin Injectable 40 milliGRAM(s) every 24 hours  folic acid 1 milliGRAM(s) daily  glucagon  Injectable 1 milliGRAM(s) once  insulin lispro (ADMELOG) corrective regimen sliding scale   three times a day before meals  LORazepam   Injectable     LORazepam   Injectable 1 milliGRAM(s) every 4 hours  LORazepam   Injectable 2 milliGRAM(s) every 2 hours PRN  LORazepam   Injectable 2 milliGRAM(s) every 1 hour PRN  losartan 100 milliGRAM(s) daily  multivitamin 1 Tablet(s) daily  nicotine - 21 mG/24Hr(s) Patch 1 Patch daily  pantoprazole    Tablet 40 milliGRAM(s) before breakfast  sodium chloride 0.9%. 1000 milliLiter(s) <Continuous>  thiamine 100 milliGRAM(s) daily      RECENT LABS - Reviewed    12-12    136  |  102  |  14  ----------------------------<  131[H]  3.6   |  20[L]  |  0.74    Ca    9.1      12 Dec 2024 07:28  Phos  3.4     12-12  Mg     1.7     12-12        Urinalysis Basic - ( 12 Dec 2024 07:28 )    Color: x / Appearance: x / SG: x / pH: x  Gluc: 131 mg/dL / Ketone: x  / Bili: x / Urobili: x   Blood: x / Protein: x / Nitrite: x   Leuk Esterase: x / RBC: x / WBC x   Sq Epi: x / Non Sq Epi: x / Bacteria: x      IMPRESSION:    MRI CERVICAL SPINE:  Bony details are better evaluated onCT scan.    There is edema in the C7 and T1 vertebral bodies, suspicious for acute   mild compression fractures.  There is edema within anterior vertebral   marginal osteophyte at C7-T1, suspicious for fracture of the osteophyte.    There is mild prevertebral edema at T1-T2.    No evidence of traumatic malalignment, instability or ligamentous injury.    No evidence of cord compression, cord edema or intrinsic T2 cord signal   abnormality.    There is an abnormal flow void in the right vertebral artery.  This may   be due to small vessel caliber and slow flow,  however right vertebral   artery dissection/blunt cerebrovascular injury is not excluded.    Follow-up CTA brain and/or MRI brain and MRA neck and brain with   precontrast fat-saturatedT1-weighted images is recommended.    There is small amount of fluid in the left C1-C2 facet joint trace fluid   in the right C1-C2 facet joint without evidence of malalignment or   ligamentous injury at C1-C2.    A 7 mm T2 hyperintense focus in the dens probably represents a   degenerative cyst.        MRI THORACIC SPINE:  Bony details limited better evaluated on CT scan.    Redemonstration of fractures at C7-T1.    The remainder of the thoracic spine is intact.    No evidence of thoracic cord compression, cord edema or intrinsic T2 cord   signal abnormality.    MRI LUMBAR SPINE:  Bony details are better evaluated on CT scan.    There is approximately 1.2 cm T1 hypointense focus in the superior   endplate of T1 with associated T2 signal hyperintensity on STIR images;   this is of uncertain etiology and clinical significance but may represent   an acute Schmorl's node.  CT and/or dedicated lumbar spine MRI with   postcontrast imaging may be obtained for further evaluation.  Otherwise,   there is no compelling evidence for acute lumbar spine fracture.      < end of copied text >    < from: MR Head No Cont (12.07.24 @ 20:15) >      IMPRESSION:  No MRI evidence of acute intracranial pathology.    There are two punctate chronic microhemorrhages in the right frontal lobe   and a chronic microhemorrhage in the left.  Frontal white matter.  No   evidence of a lobar hemorrhage..    Patchy mild microvascular type changes in the periventricular and deep   white matter.    Complete versus near complete opacification of the left maxillary sinus   with mucosal thickening, intrasinus fluid and a punctate focus of   susceptibility that may represent gas or calcified inspissated   secretions.  Acute sinusitis should be excluded clinically.    Small mastoid effusions bilaterally.  Correlate clinically for sterile   effusion versus acute otitis media.        < end of copied text >      ---------------------------------------------------------------------------------  PHYSICAL EXAM  Constitutional - NAD, Comfortable, laying in bed   Neck - cervical collar   Chest - Breathing comfortably on room air   Cardiovascular - S1S2   Extremities - No C/C/E, No calf tenderness   Neurologic Exam -   follows commands                 Cognitive - Awake, Alert, AAO to self, place, date, year, situation     Communication - Fluent, No dysarthria        Motor -moves all ext      Sensory - Intact to LT     Psychiatric - Mood stable, Affect WNL  ----------------------------------------------------------------------------------------  ASSESSMENT/PLAN  64yMale h/o DM, HTN, CAD with functional deficits after fall  MRI brain with no acute findings   MRI spine with C7, T1 compression fractures, no acute surgical intervention  ETOH on CIWA, 1:1 observation   Pain - Tylenol  DVT PPX - SCDs lovenox   Rehab -    patient requires CG with mobility   continue bedside therapy while admitted to prevent secondary complications of immobility, bed mobility, transfer training, progressive ambulation, equipment evaluation, ADLs   OOB throughout the day with staff, OOB to chair with meals/3 hours daily       recommend [facility] for continued inpatient rehabilitation, his change in mobility seems to be more due to decreased sleep/withdrawal than his injury   add melatonin for sleep    lives alone, patient reports his wife is returning to the country soon   he prefers an inpatient rehab facility close to his home in [place], understands that he will be staying there for about 1-2 weeks for daily therapy. He does not want 3 hours of therapy daily, feels that would be too much, refusing any facility that is far from his home.      Will continue to follow for ongoing rehab needs and recommendations.                   35 minutes spent on total encounter  with chart review of PT/OT notes, exam, imaging, counseling and education on inpatient rehabilitation, coordination of care with rehab team and SW

## 2024-12-12 NOTE — PROGRESS NOTE ADULT - SUBJECTIVE AND OBJECTIVE BOX
[facility] Division of Hospital Medicine  Christianne Lima MD  Available via MS Teams    SUBJECTIVE / OVERNIGHT EVENTS:  no acute events   much more alert/awake this AM, endorsing some CP across his chest and L groin pain with movement of his leg     ADDITIONAL REVIEW OF SYSTEMS:  14 point ROS negative except as noted above     MEDICATIONS  (STANDING):  aspirin enteric coated 81 milliGRAM(s) Oral daily  atorvastatin 40 milliGRAM(s) Oral at bedtime  clopidogrel Tablet 75 milliGRAM(s) Oral daily  dextrose 5%. 1000 milliLiter(s) (50 mL/Hr) IV Continuous <Continuous>  dextrose 5%. 1000 milliLiter(s) (100 mL/Hr) IV Continuous <Continuous>  dextrose 50% Injectable 25 Gram(s) IV Push once  dextrose 50% Injectable 12.5 Gram(s) IV Push once  dextrose 50% Injectable 25 Gram(s) IV Push once  enoxaparin Injectable 40 milliGRAM(s) SubCutaneous every 24 hours  folic acid 1 milliGRAM(s) Oral daily  glucagon  Injectable 1 milliGRAM(s) IntraMuscular once  insulin lispro (ADMELOG) corrective regimen sliding scale   SubCutaneous three times a day before meals  LORazepam   Injectable   IV Push   LORazepam   Injectable 1 milliGRAM(s) IV Push every 4 hours  losartan 100 milliGRAM(s) Oral daily  multivitamin 1 Tablet(s) Oral daily  nicotine - 21 mG/24Hr(s) Patch 1 Patch Transdermal daily  pantoprazole    Tablet 40 milliGRAM(s) Oral before breakfast  sodium chloride 0.9%. 1000 milliLiter(s) (100 mL/Hr) IV Continuous <Continuous>  thiamine 100 milliGRAM(s) Oral daily    MEDICATIONS  (PRN):  acetaminophen     Tablet .. 975 milliGRAM(s) Oral every 6 hours PRN Temp greater or equal to 38C (100.4F), Moderate Pain (4 - 6)  dextrose Oral Gel 15 Gram(s) Oral once PRN Blood Glucose LESS THAN 70 milliGRAM(s)/deciliter  LORazepam   Injectable 2 milliGRAM(s) IV Push every 2 hours PRN CIWA-Ar score increase by 2 points and a total score of 7 or less  LORazepam   Injectable 2 milliGRAM(s) IV Push every 1 hour PRN CIWA-Ar score 8 or greater      I&O's Summary    11 Dec 2024 07:01  -  12 Dec 2024 07:00  --------------------------------------------------------  IN: 320 mL / OUT: 1000 mL / NET: -680 mL        PHYSICAL EXAM:  Vital Signs Last 24 Hrs  T(C): 36.6 (12 Dec 2024 08:10), Max: 37 (11 Dec 2024 23:42)  T(F): 97.9 (12 Dec 2024 08:10), Max: 98.6 (11 Dec 2024 23:42)  HR: 95 (12 Dec 2024 08:10) (91 - 105)  BP: 120/87 (12 Dec 2024 08:10) (120/87 - 157/94)  BP(mean): --  RR: 18 (12 Dec 2024 08:10) (18 - 18)  SpO2: 96% (12 Dec 2024 08:10) (96% - 98%)    Parameters below as of 12 Dec 2024 08:10  Patient On (Oxygen Delivery Method): room air      PHYSICAL EXAM:  GENERAL: NAD, elderly   HEAD:  Atraumatic, normocephalic  EYES: EOMI, conjunctiva and sclera clear  NECK: Supple, no JVD  CHEST/LUNG: Clear to auscultation bilaterally; no wheezing or rales  HEART: Regular rate and rhythm; no murmurs, no LE edema   ABDOMEN: Soft, nontender, nondistended; bowel sounds present; +pain on palpation in L groin  EXTREMITIES:  2+ Peripheral Pulses, no clubbing, cyanosis, +mild tremors on exam   PSYCH: AAOx3, calm affect, not anxious  SKIN: No rashes or lesions  MUSCULOSKELETAL: +groin pain with movement of L leg     LABS:    12-12    136  |  102  |  14  ----------------------------<  131[H]  3.6   |  20[L]  |  0.74    Ca    9.1      12 Dec 2024 07:28  Phos  3.4     12-12  Mg     1.7     12-12            Urinalysis Basic - ( 12 Dec 2024 07:28 )    Color: x / Appearance: x / SG: x / pH: x  Gluc: 131 mg/dL / Ketone: x  / Bili: x / Urobili: x   Blood: x / Protein: x / Nitrite: x   Leuk Esterase: x / RBC: x / WBC x   Sq Epi: x / Non Sq Epi: x / Bacteria: x            RADIOLOGY & ADDITIONAL TESTS:  New Imaging Personally Reviewed Today:  New Electrocardiogram Personally Reviewed Today:  Other Results Reviewed Today:   Prior or Outpatient Records Reviewed Today with Summary:    COORDINATION OF CARE:  Consultant Communication and Details of Discussion (where applicable):

## 2024-12-13 LAB
GLUCOSE BLDC GLUCOMTR-MCNC: 117 MG/DL — HIGH (ref 70–99)
GLUCOSE BLDC GLUCOMTR-MCNC: 123 MG/DL — HIGH (ref 70–99)
GLUCOSE BLDC GLUCOMTR-MCNC: 124 MG/DL — HIGH (ref 70–99)
GLUCOSE BLDC GLUCOMTR-MCNC: 171 MG/DL — HIGH (ref 70–99)

## 2024-12-13 PROCEDURE — 70498 CT ANGIOGRAPHY NECK: CPT | Mod: 26

## 2024-12-13 PROCEDURE — 70496 CT ANGIOGRAPHY HEAD: CPT | Mod: 26

## 2024-12-13 PROCEDURE — 99232 SBSQ HOSP IP/OBS MODERATE 35: CPT

## 2024-12-13 RX ORDER — SODIUM CHLORIDE 9 MG/ML
1000 INJECTION, SOLUTION INTRAMUSCULAR; INTRAVENOUS; SUBCUTANEOUS
Refills: 0 | Status: DISCONTINUED | OUTPATIENT
Start: 2024-12-13 | End: 2024-12-16

## 2024-12-13 RX ADMIN — CLOPIDOGREL 75 MILLIGRAM(S): 75 TABLET, FILM COATED ORAL at 11:40

## 2024-12-13 RX ADMIN — PANTOPRAZOLE SODIUM 40 MILLIGRAM(S): 40 TABLET, DELAYED RELEASE ORAL at 05:24

## 2024-12-13 RX ADMIN — LOSARTAN POTASSIUM 100 MILLIGRAM(S): 100 TABLET, FILM COATED ORAL at 05:24

## 2024-12-13 RX ADMIN — Medication 1: at 11:41

## 2024-12-13 RX ADMIN — LORAZEPAM 0.5 MILLIGRAM(S): 2 TABLET ORAL at 13:20

## 2024-12-13 RX ADMIN — NICOTINE 1 PATCH: 21 PATCH, EXTENDED RELEASE TRANSDERMAL at 05:09

## 2024-12-13 RX ADMIN — SODIUM CHLORIDE 100 MILLILITER(S): 9 INJECTION, SOLUTION INTRAMUSCULAR; INTRAVENOUS; SUBCUTANEOUS at 14:51

## 2024-12-13 RX ADMIN — LORAZEPAM 0.5 MILLIGRAM(S): 2 TABLET ORAL at 22:18

## 2024-12-13 RX ADMIN — NICOTINE 1 PATCH: 21 PATCH, EXTENDED RELEASE TRANSDERMAL at 11:45

## 2024-12-13 RX ADMIN — Medication 40 MILLIGRAM(S): at 22:19

## 2024-12-13 RX ADMIN — LORAZEPAM 0.5 MILLIGRAM(S): 2 TABLET ORAL at 10:11

## 2024-12-13 RX ADMIN — Medication 100 MILLIGRAM(S): at 11:40

## 2024-12-13 RX ADMIN — LORAZEPAM 1 MILLIGRAM(S): 2 TABLET ORAL at 01:54

## 2024-12-13 RX ADMIN — Medication 1 TABLET(S): at 11:40

## 2024-12-13 RX ADMIN — Medication 1 MILLIGRAM(S): at 11:40

## 2024-12-13 RX ADMIN — LORAZEPAM 1 MILLIGRAM(S): 2 TABLET ORAL at 05:24

## 2024-12-13 RX ADMIN — ENOXAPARIN SODIUM 40 MILLIGRAM(S): 30 INJECTION SUBCUTANEOUS at 05:23

## 2024-12-13 RX ADMIN — NICOTINE 1 PATCH: 21 PATCH, EXTENDED RELEASE TRANSDERMAL at 19:51

## 2024-12-13 RX ADMIN — LORAZEPAM 0.5 MILLIGRAM(S): 2 TABLET ORAL at 17:21

## 2024-12-13 RX ADMIN — TRAMADOL HYDROCHLORIDE 25 MILLIGRAM(S): 300 CAPSULE ORAL at 01:19

## 2024-12-13 RX ADMIN — Medication 81 MILLIGRAM(S): at 11:40

## 2024-12-13 RX ADMIN — NICOTINE 1 PATCH: 21 PATCH, EXTENDED RELEASE TRANSDERMAL at 11:41

## 2024-12-13 NOTE — PROGRESS NOTE ADULT - SUBJECTIVE AND OBJECTIVE BOX
[facility] Division of Hospital Medicine  Christianne Lima MD  Available via MS Teams    SUBJECTIVE / OVERNIGHT EVENTS:  pt continues to endorse headache, chest pain across the chest, L groin pain and LLE pain  today endorsed L eye blurriness which he states is new since the fall   denies SOB, no abd pain, tolerating diet     ADDITIONAL REVIEW OF SYSTEMS:  14 point ROS negative except as noted above     MEDICATIONS  (STANDING):  aspirin enteric coated 81 milliGRAM(s) Oral daily  atorvastatin 40 milliGRAM(s) Oral at bedtime  clopidogrel Tablet 75 milliGRAM(s) Oral daily  dextrose 5%. 1000 milliLiter(s) (50 mL/Hr) IV Continuous <Continuous>  dextrose 5%. 1000 milliLiter(s) (100 mL/Hr) IV Continuous <Continuous>  dextrose 50% Injectable 25 Gram(s) IV Push once  dextrose 50% Injectable 12.5 Gram(s) IV Push once  dextrose 50% Injectable 25 Gram(s) IV Push once  enoxaparin Injectable 40 milliGRAM(s) SubCutaneous every 24 hours  folic acid 1 milliGRAM(s) Oral daily  glucagon  Injectable 1 milliGRAM(s) IntraMuscular once  insulin lispro (ADMELOG) corrective regimen sliding scale   SubCutaneous three times a day before meals  insulin lispro (ADMELOG) corrective regimen sliding scale   SubCutaneous at bedtime  LORazepam   Injectable   IV Push   LORazepam   Injectable 0.5 milliGRAM(s) IV Push every 4 hours  losartan 100 milliGRAM(s) Oral daily  multivitamin 1 Tablet(s) Oral daily  nicotine - 21 mG/24Hr(s) Patch 1 Patch Transdermal daily  pantoprazole    Tablet 40 milliGRAM(s) Oral before breakfast  sodium chloride 0.9%. 1000 milliLiter(s) (100 mL/Hr) IV Continuous <Continuous>  thiamine 100 milliGRAM(s) Oral daily    MEDICATIONS  (PRN):  acetaminophen     Tablet .. 975 milliGRAM(s) Oral every 6 hours PRN Temp greater or equal to 38C (100.4F), Moderate Pain (4 - 6)  dextrose Oral Gel 15 Gram(s) Oral once PRN Blood Glucose LESS THAN 70 milliGRAM(s)/deciliter  LORazepam   Injectable 2 milliGRAM(s) IV Push every 2 hours PRN CIWA-Ar score increase by 2 points and a total score of 7 or less  LORazepam   Injectable 2 milliGRAM(s) IV Push every 1 hour PRN CIWA-Ar score 8 or greater  traMADol 25 milliGRAM(s) Oral every 8 hours PRN moderate and severe pain      I&O's Summary    12 Dec 2024 07:01  -  13 Dec 2024 07:00  --------------------------------------------------------  IN: 830 mL / OUT: 0 mL / NET: 830 mL        PHYSICAL EXAM:  Vital Signs Last 24 Hrs  T(C): 36.9 (13 Dec 2024 09:31), Max: 37.1 (13 Dec 2024 05:22)  T(F): 98.4 (13 Dec 2024 09:31), Max: 98.7 (13 Dec 2024 05:22)  HR: 111 (13 Dec 2024 09:31) (104 - 111)  BP: 104/74 (13 Dec 2024 09:31) (104/74 - 137/96)  BP(mean): --  RR: 18 (13 Dec 2024 09:31) (18 - 18)  SpO2: 94% (13 Dec 2024 09:31) (94% - 98%)    Parameters below as of 13 Dec 2024 09:31  Patient On (Oxygen Delivery Method): room air      PHYSICAL EXAM:  GENERAL: NAD, elderly, disheveled   HEAD:  Atraumatic, normocephalic  EYES: EOMI, conjunctiva and sclera clear  NECK: C collar in place   CHEST/LUNG: Clear to auscultation bilaterally; no wheezing or rales  HEART: Regular rate and rhythm; no murmurs, no LE edema   ABDOMEN: Soft, nontender, nondistended; bowel sounds present; some L groin TTP, no swelling/bruising noted  EXTREMITIES:  2+ Peripheral Pulses, no clubbing, cyanosis  PSYCH: alert, awake, calm affect, not anxious  SKIN: mild ecchymoses along L internal thigh   MUSCULOSKELETAL: no back pain, moving all extremities    LABS:    12-12    136  |  102  |  14  ----------------------------<  131[H]  3.6   |  20[L]  |  0.74    Ca    9.1      12 Dec 2024 07:28  Phos  3.4     12-12  Mg     1.7     12-12        CARDIAC MARKERS ( 12 Dec 2024 07:28 )  x     / x     / x     / x     / 1.3 ng/mL      Urinalysis Basic - ( 12 Dec 2024 07:28 )    Color: x / Appearance: x / SG: x / pH: x  Gluc: 131 mg/dL / Ketone: x  / Bili: x / Urobili: x   Blood: x / Protein: x / Nitrite: x   Leuk Esterase: x / RBC: x / WBC x   Sq Epi: x / Non Sq Epi: x / Bacteria: x            RADIOLOGY & ADDITIONAL TESTS:  New Imaging Personally Reviewed Today:  New Electrocardiogram Personally Reviewed Today:  Other Results Reviewed Today:   Prior or Outpatient Records Reviewed Today with Summary:    COORDINATION OF CARE:  Consultant Communication and Details of Discussion (where applicable):

## 2024-12-13 NOTE — PROGRESS NOTE ADULT - PROBLEM SELECTOR PLAN 8
DVT ppx - lovenox subq  Diet - DASH CC  Dispo - PM+R reccs [facility], which pt is agreeable with - anticipate pt will be medically ready this weekend, SW working on [facility] referral

## 2024-12-14 LAB
GLUCOSE BLDC GLUCOMTR-MCNC: 115 MG/DL — HIGH (ref 70–99)
GLUCOSE BLDC GLUCOMTR-MCNC: 116 MG/DL — HIGH (ref 70–99)
GLUCOSE BLDC GLUCOMTR-MCNC: 130 MG/DL — HIGH (ref 70–99)
GLUCOSE BLDC GLUCOMTR-MCNC: 131 MG/DL — HIGH (ref 70–99)

## 2024-12-14 PROCEDURE — 99233 SBSQ HOSP IP/OBS HIGH 50: CPT

## 2024-12-14 RX ADMIN — Medication 40 MILLIGRAM(S): at 21:13

## 2024-12-14 RX ADMIN — TRAMADOL HYDROCHLORIDE 25 MILLIGRAM(S): 300 CAPSULE ORAL at 21:13

## 2024-12-14 RX ADMIN — TRAMADOL HYDROCHLORIDE 25 MILLIGRAM(S): 300 CAPSULE ORAL at 22:13

## 2024-12-14 RX ADMIN — NICOTINE 1 PATCH: 21 PATCH, EXTENDED RELEASE TRANSDERMAL at 11:54

## 2024-12-14 RX ADMIN — Medication 1 TABLET(S): at 11:55

## 2024-12-14 RX ADMIN — TRAMADOL HYDROCHLORIDE 25 MILLIGRAM(S): 300 CAPSULE ORAL at 10:16

## 2024-12-14 RX ADMIN — Medication 81 MILLIGRAM(S): at 11:55

## 2024-12-14 RX ADMIN — LORAZEPAM 0.5 MILLIGRAM(S): 2 TABLET ORAL at 17:36

## 2024-12-14 RX ADMIN — ACETAMINOPHEN 500MG 975 MILLIGRAM(S): 500 TABLET, COATED ORAL at 13:45

## 2024-12-14 RX ADMIN — ACETAMINOPHEN 500MG 975 MILLIGRAM(S): 500 TABLET, COATED ORAL at 02:35

## 2024-12-14 RX ADMIN — LORAZEPAM 0.5 MILLIGRAM(S): 2 TABLET ORAL at 02:32

## 2024-12-14 RX ADMIN — CLOPIDOGREL 75 MILLIGRAM(S): 75 TABLET, FILM COATED ORAL at 11:55

## 2024-12-14 RX ADMIN — ACETAMINOPHEN 500MG 975 MILLIGRAM(S): 500 TABLET, COATED ORAL at 13:15

## 2024-12-14 RX ADMIN — LORAZEPAM 0.5 MILLIGRAM(S): 2 TABLET ORAL at 06:17

## 2024-12-14 RX ADMIN — LOSARTAN POTASSIUM 100 MILLIGRAM(S): 100 TABLET, FILM COATED ORAL at 06:18

## 2024-12-14 RX ADMIN — ACETAMINOPHEN 500MG 975 MILLIGRAM(S): 500 TABLET, COATED ORAL at 01:35

## 2024-12-14 RX ADMIN — NICOTINE 1 PATCH: 21 PATCH, EXTENDED RELEASE TRANSDERMAL at 12:03

## 2024-12-14 RX ADMIN — Medication 1 MILLIGRAM(S): at 11:55

## 2024-12-14 RX ADMIN — Medication 100 MILLIGRAM(S): at 11:55

## 2024-12-14 RX ADMIN — PANTOPRAZOLE SODIUM 40 MILLIGRAM(S): 40 TABLET, DELAYED RELEASE ORAL at 06:18

## 2024-12-14 RX ADMIN — ENOXAPARIN SODIUM 40 MILLIGRAM(S): 30 INJECTION SUBCUTANEOUS at 06:17

## 2024-12-14 RX ADMIN — NICOTINE 1 PATCH: 21 PATCH, EXTENDED RELEASE TRANSDERMAL at 07:44

## 2024-12-14 NOTE — PROGRESS NOTE ADULT - SUBJECTIVE AND OBJECTIVE BOX
Contact Information:  Sudhir Coleman II, MD, MPH  Internal Medicine    ZUNILDA BETANCUR, MRN-[id number]    Patient is a 64y old  Male who presents with a chief complaint of Alcohol withdrawal (15 Dec 2024 09:05)      OVERNIGHT EVENTS/INTERVAL/SUBJECTIVE: Patient evaluated at bedside, states that he has persistent left sided pain that is unchanged from prior - able to ambulate to bathroom with ambulatory aid and void urine and bowels without any issue. He denies ABD pain, lightheadedness, dizziness, CP, SOB, diarrhea, constipation, nausea/vomiting.      ROS negative except as stated above.      OBJECTIVE:  Vital Signs Last 24 Hrs  T(C): 36.8 (15 Dec 2024 01:33), Max: 36.9 (15 Dec 2024 00:59)  T(F): 98.2 (15 Dec 2024 01:33), Max: 98.5 (15 Dec 2024 00:59)  HR: 100 (15 Dec 2024 01:33) (80 - 101)  BP: 152/100 (15 Dec 2024 01:33) (126/88 - 167/104)  BP(mean): --  RR: 18 (15 Dec 2024 01:33) (18 - 18)  SpO2: 95% (15 Dec 2024 01:33) (93% - 97%)    Parameters below as of 15 Dec 2024 01:33  Patient On (Oxygen Delivery Method): room air      I&O's Summary    14 Dec 2024 07:01  -  15 Dec 2024 07:00  --------------------------------------------------------  IN: 0 mL / OUT: 400 mL / NET: -400 mL        MEDICATIONS  (STANDING):  aspirin enteric coated 81 milliGRAM(s) Oral daily  atorvastatin 40 milliGRAM(s) Oral at bedtime  clopidogrel Tablet 75 milliGRAM(s) Oral daily  dextrose 5%. 1000 milliLiter(s) (100 mL/Hr) IV Continuous <Continuous>  dextrose 5%. 1000 milliLiter(s) (50 mL/Hr) IV Continuous <Continuous>  dextrose 50% Injectable 25 Gram(s) IV Push once  dextrose 50% Injectable 12.5 Gram(s) IV Push once  dextrose 50% Injectable 25 Gram(s) IV Push once  enoxaparin Injectable 40 milliGRAM(s) SubCutaneous every 24 hours  folic acid 1 milliGRAM(s) Oral daily  glucagon  Injectable 1 milliGRAM(s) IntraMuscular once  insulin lispro (ADMELOG) corrective regimen sliding scale   SubCutaneous three times a day before meals  insulin lispro (ADMELOG) corrective regimen sliding scale   SubCutaneous at bedtime  losartan 100 milliGRAM(s) Oral daily  multivitamin 1 Tablet(s) Oral daily  nicotine - 21 mG/24Hr(s) Patch 1 Patch Transdermal daily  pantoprazole    Tablet 40 milliGRAM(s) Oral before breakfast  sodium chloride 0.9%. 1000 milliLiter(s) (100 mL/Hr) IV Continuous <Continuous>  thiamine 100 milliGRAM(s) Oral daily    MEDICATIONS  (PRN):  acetaminophen     Tablet .. 975 milliGRAM(s) Oral every 6 hours PRN Temp greater or equal to 38C (100.4F), Moderate Pain (4 - 6)  dextrose Oral Gel 15 Gram(s) Oral once PRN Blood Glucose LESS THAN 70 milliGRAM(s)/deciliter  traMADol 25 milliGRAM(s) Oral every 8 hours PRN moderate and severe pain    Allergies    No Known Allergies    Intolerances        CONSTITUTIONAL: No acute distress. Awake and alert.  ENT: C-collar in place.  RESPIRATORY: CTAB. No wheezes, rales, or rhonchi. No accessory muscle use. No apparent respiratory distress.  CARDIOVASCULAR: +S1/S2. No audible S3/S4. Regular rate and rhythm. No murmurs, rubs, or gallops. No LE swelling or edema.  GASTROINTESTINAL: Soft, nontender, nondistended. +BS. No rebound or guarding.   MUSCULOSKELETAL: Slow but voluntarily spontaneous movement in all extremities.  NEUROLOGICAL: CN 2-12 grossly intact. Left sided weakness. Sensation intact x 4EXT.   PSYCHIATRIC: Appropriate affect. A&Ox3 (oriented to person, place, and time).            12-15    136  |  101  |  13  ----------------------------<  159[H]  3.5   |  20[L]  |  0.74    Ca    9.4      15 Dec 2024 07:23  Mg     1.6     12-15      CAPILLARY BLOOD GLUCOSE      POCT Blood Glucose.: 119 mg/dL (15 Dec 2024 08:16)  POCT Blood Glucose.: 131 mg/dL (14 Dec 2024 21:06)  POCT Blood Glucose.: 130 mg/dL (14 Dec 2024 16:48)  POCT Blood Glucose.: 115 mg/dL (14 Dec 2024 11:55)      CARDIAC MARKERS ( 15 Dec 2024 07:23 )  x     / x     / x     / x     / 1.8 ng/mL  CARDIAC MARKERS ( 15 Dec 2024 02:30 )  x     / x     / x     / x     / 2.0 ng/mL      Urinalysis Basic - ( 15 Dec 2024 07:23 )    Color: x / Appearance: x / SG: x / pH: x  Gluc: 159 mg/dL / Ketone: x  / Bili: x / Urobili: x   Blood: x / Protein: x / Nitrite: x   Leuk Esterase: x / RBC: x / WBC x   Sq Epi: x / Non Sq Epi: x / Bacteria: x            RADIOLOGY AND ADDITIONAL TESTS:    CONSULTANT NOTES REVIEWED:    CARE DISCUSSED WITH THE FOLLOWING CONSULTANTS/PROVIDERS: Contact Information:  Sudhir Coleman II, MD, MPH  Internal Medicine    ZUNILDA BETANCUR, MRN-[id number]    Patient is a 64y old  Male who presents with a chief complaint of Alcohol withdrawal (15 Dec 2024 09:05)      OVERNIGHT EVENTS/INTERVAL/SUBJECTIVE: Patient evaluated at bedside, states that he has persistent left sided pain that is unchanged from prior - able to ambulate to bathroom with ambulatory aid and void urine and bowels without any issue. He denies ABD pain, lightheadedness, dizziness, CP, SOB, diarrhea, constipation, nausea/vomiting.      ROS negative except as stated above.      OBJECTIVE:  Vital Signs Last 24 Hrs  T(C): 36.8 (15 Dec 2024 01:33), Max: 36.9 (15 Dec 2024 00:59)  T(F): 98.2 (15 Dec 2024 01:33), Max: 98.5 (15 Dec 2024 00:59)  HR: 100 (15 Dec 2024 01:33) (80 - 101)  BP: 152/100 (15 Dec 2024 01:33) (126/88 - 167/104)  BP(mean): --  RR: 18 (15 Dec 2024 01:33) (18 - 18)  SpO2: 95% (15 Dec 2024 01:33) (93% - 97%)    Parameters below as of 15 Dec 2024 01:33  Patient On (Oxygen Delivery Method): room air      I&O's Summary    14 Dec 2024 07:01  -  15 Dec 2024 07:00  --------------------------------------------------------  IN: 0 mL / OUT: 400 mL / NET: -400 mL        MEDICATIONS  (STANDING):  aspirin enteric coated 81 milliGRAM(s) Oral daily  atorvastatin 40 milliGRAM(s) Oral at bedtime  clopidogrel Tablet 75 milliGRAM(s) Oral daily  dextrose 5%. 1000 milliLiter(s) (100 mL/Hr) IV Continuous <Continuous>  dextrose 5%. 1000 milliLiter(s) (50 mL/Hr) IV Continuous <Continuous>  dextrose 50% Injectable 25 Gram(s) IV Push once  dextrose 50% Injectable 12.5 Gram(s) IV Push once  dextrose 50% Injectable 25 Gram(s) IV Push once  enoxaparin Injectable 40 milliGRAM(s) SubCutaneous every 24 hours  folic acid 1 milliGRAM(s) Oral daily  glucagon  Injectable 1 milliGRAM(s) IntraMuscular once  insulin lispro (ADMELOG) corrective regimen sliding scale   SubCutaneous three times a day before meals  insulin lispro (ADMELOG) corrective regimen sliding scale   SubCutaneous at bedtime  losartan 100 milliGRAM(s) Oral daily  multivitamin 1 Tablet(s) Oral daily  nicotine - 21 mG/24Hr(s) Patch 1 Patch Transdermal daily  pantoprazole    Tablet 40 milliGRAM(s) Oral before breakfast  sodium chloride 0.9%. 1000 milliLiter(s) (100 mL/Hr) IV Continuous <Continuous>  thiamine 100 milliGRAM(s) Oral daily    MEDICATIONS  (PRN):  acetaminophen     Tablet .. 975 milliGRAM(s) Oral every 6 hours PRN Temp greater or equal to 38C (100.4F), Moderate Pain (4 - 6)  dextrose Oral Gel 15 Gram(s) Oral once PRN Blood Glucose LESS THAN 70 milliGRAM(s)/deciliter  traMADol 25 milliGRAM(s) Oral every 8 hours PRN moderate and severe pain    Allergies    No Known Allergies    Intolerances        CONSTITUTIONAL: No acute distress. Awake and alert.  ENT: C-collar in place.  RESPIRATORY: CTAB. No wheezes, rales, or rhonchi. No accessory muscle use. No apparent respiratory distress.  CARDIOVASCULAR: +S1/S2. No audible S3/S4. Regular rate and rhythm. No murmurs, rubs, or gallops. No LE swelling or edema.  GASTROINTESTINAL: Soft, nontender, nondistended. +BS. No rebound or guarding.   MUSCULOSKELETAL: Slow but voluntarily spontaneous movement in all extremities.  NEUROLOGICAL: CN 2-12 grossly intact. Sensation intact x 4EXT.   PSYCHIATRIC: Appropriate affect. A&Ox3 (oriented to person, place, and time).            12-15    136  |  101  |  13  ----------------------------<  159[H]  3.5   |  20[L]  |  0.74    Ca    9.4      15 Dec 2024 07:23  Mg     1.6     12-15      CAPILLARY BLOOD GLUCOSE      POCT Blood Glucose.: 119 mg/dL (15 Dec 2024 08:16)  POCT Blood Glucose.: 131 mg/dL (14 Dec 2024 21:06)  POCT Blood Glucose.: 130 mg/dL (14 Dec 2024 16:48)  POCT Blood Glucose.: 115 mg/dL (14 Dec 2024 11:55)      CARDIAC MARKERS ( 15 Dec 2024 07:23 )  x     / x     / x     / x     / 1.8 ng/mL  CARDIAC MARKERS ( 15 Dec 2024 02:30 )  x     / x     / x     / x     / 2.0 ng/mL      Urinalysis Basic - ( 15 Dec 2024 07:23 )    Color: x / Appearance: x / SG: x / pH: x  Gluc: 159 mg/dL / Ketone: x  / Bili: x / Urobili: x   Blood: x / Protein: x / Nitrite: x   Leuk Esterase: x / RBC: x / WBC x   Sq Epi: x / Non Sq Epi: x / Bacteria: x            RADIOLOGY AND ADDITIONAL TESTS:    CONSULTANT NOTES REVIEWED:    CARE DISCUSSED WITH THE FOLLOWING CONSULTANTS/PROVIDERS:

## 2024-12-15 LAB
ANION GAP SERPL CALC-SCNC: 15 MMOL/L — SIGNIFICANT CHANGE UP (ref 5–17)
BUN SERPL-MCNC: 13 MG/DL — SIGNIFICANT CHANGE UP (ref 7–23)
CALCIUM SERPL-MCNC: 9.4 MG/DL — SIGNIFICANT CHANGE UP (ref 8.4–10.5)
CHLORIDE SERPL-SCNC: 101 MMOL/L — SIGNIFICANT CHANGE UP (ref 96–108)
CK MB CFR SERPL CALC: 1.8 NG/ML — SIGNIFICANT CHANGE UP (ref 0–6.7)
CK MB CFR SERPL CALC: 2 NG/ML — SIGNIFICANT CHANGE UP (ref 0–6.7)
CK SERPL-CCNC: 18 U/L — LOW (ref 30–200)
CK SERPL-CCNC: 19 U/L — LOW (ref 30–200)
CO2 SERPL-SCNC: 20 MMOL/L — LOW (ref 22–31)
CREAT SERPL-MCNC: 0.74 MG/DL — SIGNIFICANT CHANGE UP (ref 0.5–1.3)
EGFR: 101 ML/MIN/1.73M2 — SIGNIFICANT CHANGE UP
GLUCOSE BLDC GLUCOMTR-MCNC: 111 MG/DL — HIGH (ref 70–99)
GLUCOSE BLDC GLUCOMTR-MCNC: 113 MG/DL — HIGH (ref 70–99)
GLUCOSE BLDC GLUCOMTR-MCNC: 119 MG/DL — HIGH (ref 70–99)
GLUCOSE BLDC GLUCOMTR-MCNC: 141 MG/DL — HIGH (ref 70–99)
GLUCOSE SERPL-MCNC: 159 MG/DL — HIGH (ref 70–99)
MAGNESIUM SERPL-MCNC: 1.6 MG/DL — SIGNIFICANT CHANGE UP (ref 1.6–2.6)
POTASSIUM SERPL-MCNC: 3.5 MMOL/L — SIGNIFICANT CHANGE UP (ref 3.5–5.3)
POTASSIUM SERPL-SCNC: 3.5 MMOL/L — SIGNIFICANT CHANGE UP (ref 3.5–5.3)
SODIUM SERPL-SCNC: 136 MMOL/L — SIGNIFICANT CHANGE UP (ref 135–145)
TROPONIN T, HIGH SENSITIVITY RESULT: <6 NG/L — SIGNIFICANT CHANGE UP (ref 0–51)
TROPONIN T, HIGH SENSITIVITY RESULT: <6 NG/L — SIGNIFICANT CHANGE UP (ref 0–51)

## 2024-12-15 PROCEDURE — 99233 SBSQ HOSP IP/OBS HIGH 50: CPT

## 2024-12-15 PROCEDURE — 93010 ELECTROCARDIOGRAM REPORT: CPT

## 2024-12-15 RX ORDER — ACETAMINOPHEN 500MG 500 MG/1
1000 TABLET, COATED ORAL ONCE
Refills: 0 | Status: COMPLETED | OUTPATIENT
Start: 2024-12-15 | End: 2024-12-15

## 2024-12-15 RX ORDER — POVIDONE, POLYVINYL ALCOHOL 20; 27 G/1000ML; G/1000ML
1 SOLUTION OPHTHALMIC
Refills: 0 | Status: DISCONTINUED | OUTPATIENT
Start: 2024-12-15 | End: 2024-12-16

## 2024-12-15 RX ADMIN — NICOTINE 1 PATCH: 21 PATCH, EXTENDED RELEASE TRANSDERMAL at 19:10

## 2024-12-15 RX ADMIN — NICOTINE 1 PATCH: 21 PATCH, EXTENDED RELEASE TRANSDERMAL at 11:00

## 2024-12-15 RX ADMIN — ACETAMINOPHEN 500MG 1000 MILLIGRAM(S): 500 TABLET, COATED ORAL at 03:16

## 2024-12-15 RX ADMIN — ACETAMINOPHEN 500MG 975 MILLIGRAM(S): 500 TABLET, COATED ORAL at 13:27

## 2024-12-15 RX ADMIN — Medication 100 MILLIGRAM(S): at 11:37

## 2024-12-15 RX ADMIN — ACETAMINOPHEN 500MG 400 MILLIGRAM(S): 500 TABLET, COATED ORAL at 02:16

## 2024-12-15 RX ADMIN — PANTOPRAZOLE SODIUM 40 MILLIGRAM(S): 40 TABLET, DELAYED RELEASE ORAL at 05:26

## 2024-12-15 RX ADMIN — Medication 81 MILLIGRAM(S): at 11:36

## 2024-12-15 RX ADMIN — ACETAMINOPHEN 500MG 1000 MILLIGRAM(S): 500 TABLET, COATED ORAL at 23:50

## 2024-12-15 RX ADMIN — LOSARTAN POTASSIUM 100 MILLIGRAM(S): 100 TABLET, FILM COATED ORAL at 05:25

## 2024-12-15 RX ADMIN — ENOXAPARIN SODIUM 40 MILLIGRAM(S): 30 INJECTION SUBCUTANEOUS at 05:25

## 2024-12-15 RX ADMIN — TRAMADOL HYDROCHLORIDE 25 MILLIGRAM(S): 300 CAPSULE ORAL at 17:35

## 2024-12-15 RX ADMIN — CLOPIDOGREL 75 MILLIGRAM(S): 75 TABLET, FILM COATED ORAL at 11:36

## 2024-12-15 RX ADMIN — POVIDONE, POLYVINYL ALCOHOL 1 DROP(S): 20; 27 SOLUTION OPHTHALMIC at 23:13

## 2024-12-15 RX ADMIN — Medication 1 MILLIGRAM(S): at 11:36

## 2024-12-15 RX ADMIN — NICOTINE 1 PATCH: 21 PATCH, EXTENDED RELEASE TRANSDERMAL at 06:40

## 2024-12-15 RX ADMIN — NICOTINE 1 PATCH: 21 PATCH, EXTENDED RELEASE TRANSDERMAL at 11:36

## 2024-12-15 RX ADMIN — ACETAMINOPHEN 500MG 400 MILLIGRAM(S): 500 TABLET, COATED ORAL at 22:50

## 2024-12-15 RX ADMIN — Medication 1 TABLET(S): at 11:37

## 2024-12-15 RX ADMIN — Medication 40 MILLIGRAM(S): at 21:57

## 2024-12-15 NOTE — PROGRESS NOTE ADULT - PROBLEM SELECTOR PROBLEM 2
Alcohol use with withdrawal
Mild alcohol use disorder
Alcohol use with withdrawal

## 2024-12-15 NOTE — CHART NOTE - NSCHARTNOTEFT_GEN_A_CORE
CC: chest pain    HPI:  Called by RN as patient complained of left sided aching chest pain that radiates to his left shoulder, states the left shoulder pain has been there since the fall and is unsure if the chest pain is new however this current pain started tonight. Patient reports touching the pain makes it worse.   Patient denied headache, dizziness, SOB, abdominal  pain, N/V/D, numbness/tingling, extremity weakness, dysuria.       ROS:  CONSTITUTIONAL:  No fever, chills, rigors  CARDIOVASCULAR:  reproducible chest pain no palpitations  RESPIRATORY:   No SOB, cough, dyspnea on exertion.  No wheezing  GASTROINTESTINAL:  No abd pain, N/V, diarrhea/constipation  EXTREMITIES:  No swelling or joint pain  GENITOURINARY:  No burning on urination, increased frequency or urgency.  No flank pain  NEUROLOGIC:  No HA, visual disturbances  SKIN: No rashes      PAST MEDICAL & SURGICAL HISTORY:  HTN (hypertension)  HLD (hyperlipidemia)  DM (diabetes mellitus)  CAD (coronary artery disease)  S/P CABG (coronary artery bypass graft)  History of appendectomy    Vital Signs Last 24 Hrs  T(C): 36.8 (15 Dec 2024 01:33), Max: 36.9 (15 Dec 2024 00:59)  T(F): 98.2 (15 Dec 2024 01:33), Max: 98.5 (15 Dec 2024 00:59)  HR: 100 (15 Dec 2024 01:33) (80 - 101)  BP: 152/100 (15 Dec 2024 01:33) (126/88 - 167/104)  BP(mean): --  RR: 18 (15 Dec 2024 01:33) (18 - 18)  SpO2: 95% (15 Dec 2024 01:33) (93% - 97%)    Parameters below as of 15 Dec 2024 01:33  Patient On (Oxygen Delivery Method): room air      Physical Exam:  General: WN/WD NAD, AOx3, nontoxic appearing  Head:  NC/AT  Neck in hard collar  CV: RRR, S1S2   Respiratory: CTA B/L, nonlabored  Abdominal: (+) bowel sounds x4. Soft, NT, ND, no palpable mass, no guarding, or rebound tenderness  Genitourinary: ? Adkins   MSK: No BLLE edema, + peripheral pulses, FROM all 4 extremity  Skin: (+) warm, dry   Psych: Appropriate affect       Labs:  CARDIAC MARKERS ( 15 Dec 2024 02:30 )  x     / x     / x     / x     / 2.0 ng/mL    Troponin T, High Sensitivity Result: <6: *   *         Radiology:    < from: CT Angio Neck w/ IV Cont (12.13.24 @ 12:39) >    IMPRESSION:  No acute intracranial hemorrhage    No evidence of vertebral artery dissection.    Moderate (approximately 50%) stenosis at the proximal right internal   carotid artery.    Moderate to severe (60-70%) stenosis at the proximal left internal   carotid artery.    Intracranial stenosis as described above      < end of copied text >      Assessment & Plan:  HPI:  64 year old male with hx of T2DM, CAD s/p CABG, HTN, HLD, transferred from [facility] for further evaluation after mechanical fall down stairs. The previous night, patient experienced a fall on stairs after slipping. Pt was wearing slippers and was turning around to close the door when he slipped. He fell down about 5-6 steps. He felt he may have passed out but is unsure. When he came to, he was at the bottom of the stairs and felt pain in his head, L arm, and L lateral chest.  Patient was evaluated at [facility] with CT imaging without any obvious traumatic injury.  Transferred for concern for spinal cord pathology due to left lower extremity numbness and weakness. He denies any incontinence, saddle anesthesia. Currently not endorsing any weakness or sensory changes. In the ED, patient was evaluated by trauma surgery and ortho-spine. MRI spine notable for edema in vertebral bodies of C7 and T1 concerning for compression fx. No evidence of spinal cord compression. Pt maintained in C collar. Pt was also noted to be tachycardic and  tremulous raising concern for alcohol withdrawal so was given valium in ED. Pt says he was not drinking around time of fall. BAL normal in ED. Pt admitted for further management.  (08 Dec 2024 01:55) Now with complaints of chest pain      Plan  EKG ST some changes in AVF compared to previous EKG (reviewed by Dr Jackson, hospitalist) EKG PLACED IN CHART  first sent of cardiac enzymes negative  Second set of enzymes collected, pending results  IF chest pain persist repeat EKG  1 gram of acetaminophen patient reports some relief of CP  consider ordering a chest x-ray as pain is reproducible  could be due to trauma of the fall  Endorsed to primary day provider, day hospitalist to follow    Primary Team to follow up this AM, attending to follow     Allison Lemus NP  Dept of Medicine

## 2024-12-15 NOTE — PROGRESS NOTE ADULT - PROBLEM SELECTOR PLAN 8
DVT ppx - Lovenox subQ  Diet - DASH CC  Dispo - PM+R recs [facility], which pt is agreeable with. Eventual DC pending optimization of pain. #Visual disturbance  - C/o blurry vision  - Evaluated by Ophtho - likely due to cataracts  - Appreciate Ophtho recs - artificial tears and outpatient follow-up    DVT ppx - Lovenox subQ  Diet - DASH CC  Dispo - PM+R recs [facility], which pt is agreeable with. Eventual DC pending optimization of pain.

## 2024-12-15 NOTE — PROVIDER CONTACT NOTE (OTHER) - SITUATION
Pt states CP started suddenly.
Pt states that he feels pain from his head straight down to his groin area. Left eye blurry vision. Both symptoms for the past 3 days, not acute symptoms.
pt c/o upset stomach, feeling gas

## 2024-12-15 NOTE — PROGRESS NOTE ADULT - PROBLEM SELECTOR PLAN 4
- Continue atorvastatin 40mg   - Continue ASA 81mg and Plavix 75mg qd

## 2024-12-15 NOTE — PROGRESS NOTE ADULT - PROBLEM SELECTOR PLAN 2
Per pt, last drink on Wed and BAL neg on admission, but noted with increasing CIWAs since 12/9  - CIWA as high as 13 overnight   - switch to IV ativan taper for now   - SW following   - MV, thiamine, folate, IVF     #Hypokalemia - replete and monitor
Per pt, last drink on Wed and BAL neg on admission, but noted with increasing CIWAs since 12/9  - switched to IV ativan taper 12/10 due to CIWAs ~13   - CIWAs improving overall, c/w IV ativan taper, anticipate another 1-2 days   - SW following   - MV, thiamine, folate, IVF     #Hypokalemia - replete and monitor
Per pt, last drink on Wed and BAL neg on admission, but noted with increasing CIWAs since 12/9  - switched to IV ativan taper 12/10 due to CIWAs ~13   - CIWAs better currently    - SW following   - MV, thiamine, folate, IVF     #Hypokalemia - replete and monitor
Concern for alcohol withdrawal earlier in ED given tachycardia, tongue fasciculations, bilateral hand tremor. Pt does not endorse significant alcohol consumption - about one drink every 1-2 weeks. Last drink per patient on Wednesday 12/4. Pt was not drinking at time of fall. BAL normal on admission   - CIWA elevated this AM with confusion and ?hallucinations   - will place on ativan taper for now and monitor CIWAs closely   - SW following     #Hypokalemia - replete and monitor
- Per pt, last drink on Wed and BAL neg on admission, but noted with increasing CIWAs since 12/9  - switched to IV ativan taper 12/10 due to CIWAs ~13   - CIWAs improving overall, c/w IV ativan taper  - SW following   - MV, thiamine, folate, IVF     #Hypokalemia - replete and monitor
Per pt, last drink on Wed and BAL neg on admission, but noted with increasing CIWAs since 12/9  - switched to IV ativan taper 12/10 due to CIWAs ~13   - CIWAs improving overall, c/w IV ativan taper, anticipate another 2 days   - SW following   - MV, thiamine, folate, IVF     #Hypokalemia - replete and monitor
- Per pt, last drink on Wed and BAL neg on admission, but noted with increasing CIWAs since 12/9  - switched to IV ativan taper 12/10 due to CIWAs ~13   - CIWAs improving overall, c/w IV ativan taper, anticipate another 1-2 days   - SW following   - MV, thiamine, folate, IVF     #Hypokalemia - replete and monitor

## 2024-12-15 NOTE — PROGRESS NOTE ADULT - PROBLEM SELECTOR PLAN 7
- Patient does not remember his medications and dosages. His wife is away and he has no one who can bring his medication list. Medication list currently incomplete - retrieved from [employer]  - C/w current meds
DVT ppx - lovenox subq  Diet - DASH CC  Dispo - PT reccs inpt rehab, PM+R following although pt prefers home
Patient does not remember his medications and dosages. His wife is away and he has no one who can bring his medication list. Medication list currently incomplete - retrieved from [facility]  - c/w current meds
- Patient does not remember his medications and dosages. His wife is away and he has no one who can bring his medication list. Medication list currently incomplete - retrieved from [facility]  - c/w current meds
Patient does not remember his medications and dosages. His wife is away and he has no one who can bring his medication list. Medication list currently incomplete - retrieved from [facility]  - c/w current meds
DVT ppx - lovenox subq  Diet - DASH CC  Dispo - PT reccs inpt rehab, PM+R following although pt prefers home
DVT ppx - lovenox subq  Diet - DASH CC  Dispo - PT reccs inpt rehab, PM+R following

## 2024-12-15 NOTE — PROGRESS NOTE ADULT - ASSESSMENT
64 year old male with hx of T2DM, CAD s/p CABG, HTN, HLD, transferred from [facility] for further evaluation after mechanical fall down stairs with concerns for spinal cord injury. MRI spine here without cord compression but noted to have possible vertebral compression fracture of C7, T1. Pt admitted for further workup and management. 
64M PMHx T2DM, CAD s/p CABG, HTN, HLD, transferred from [facility] for further evaluation after mechanical fall down stairs with concerns for spinal cord injury. MRI spine here without cord compression but noted to have possible vertebral compression fracture of C7, T1. Pt admitted for further workup and management, course c/b alcohol withdrawal.
64 year old male with hx of T2DM, CAD s/p CABG, HTN, HLD, transferred from [facility] for further evaluation after mechanical fall down stairs with concerns for spinal cord injury. MRI spine here without cord compression but noted to have possible vertebral compression fracture of C7, T1. Pt admitted for further workup and management, course c/b alcohol withdrawal 
64 year old male with hx of T2DM, CAD s/p CABG, HTN, HLD, transferred from [facility] for further evaluation after mechanical fall down stairs with concerns for spinal cord injury. MRI spine here without cord compression but noted to have possible vertebral compression fracture of C7, T1. Pt admitted for further workup and management. 
64M PMHx T2DM, CAD s/p CABG, HTN, HLD, transferred from [facility] for further evaluation after mechanical fall down stairs with concerns for spinal cord injury. MRI spine here without cord compression but noted to have possible vertebral compression fracture of C7, T1. Pt admitted for further workup and management, course c/b alcohol withdrawal.
64 year old male with hx of T2DM, CAD s/p CABG, HTN, HLD, transferred from [facility] for further evaluation after mechanical fall down stairs with concerns for spinal cord injury. MRI spine here without cord compression but noted to have possible vertebral compression fracture of C7, T1. Pt admitted for further workup and management. 
64 year old male with hx of T2DM, CAD s/p CABG, HTN, HLD, transferred from [facility] for further evaluation after mechanical fall down stairs with concerns for spinal cord injury. MRI spine here without cord compression but noted to have possible vertebral compression fracture of C7, T1. Pt admitted for further workup and management, course c/b alcohol withdrawal

## 2024-12-15 NOTE — PROGRESS NOTE ADULT - PROBLEM SELECTOR PLAN 5
- Continue losartan 100mg qd
- Continue losartan 100
- Continue losartan 100mg qd
- Continue losartan 100
- Continue losartan 100mg qd

## 2024-12-15 NOTE — PROGRESS NOTE ADULT - SUBJECTIVE AND OBJECTIVE BOX
Contact Information:  Sudhir Coleman II, MD, MPH  Internal Medicine    ZUNILDA BETANCUR, MRN-[id number]    Patient is a 64y old  Male who presents with a chief complaint of Alcohol withdrawal (15 Dec 2024 09:05)      OVERNIGHT EVENTS/INTERVAL/SUBJECTIVE: Patient had left sided pain last night, EKG and troponins negative. Patient evaluated at bedside, endorsing continued left sided pain which patient states has been adversely affecting his sleep. He also says that he had some right sided numbness possibly due to sleeping on his right side. He denies any issues with urination or defecation, SOB, CP, ABD pain, lightheadedness, dizziness, HA.    CONSTITUTIONAL: No weakness. No fatigue. No fever.  HEAD: No head trauma.   EYES: No vision changes.  ENT: No hearing changes or tinnitus. No ear pain. No changes in smell. No nasal congestion or discharge. No sore throat. No voice hoarseness.   NECK: No neck pain or stiffness. No lumps.  RESPIRATORY: No cough. No SOB. No wheezing. No hemoptysis.   CARDIOVASCULAR: No chest pain. No palpitations.   GASTROINTESTINAL: No dysphagia. No ABD pain. No distension. No constipation. No diarrhea. No pain with defecation. No hematemesis. No hematochezia or melena.  BACK: No back pain.  GENITOURINARY: No dysuria. No frequency or urgency. No hesitancy. No incontinence. No urinary retention. No suprapubic pain. No hematuria.  EXTREMITY: No swelling.  MUSCULOSKELETAL: +Left sided pain. No joint pain or swelling. No fractures. No stiffness.    SKIN: No rashes. No itching. No skin, hair, or nail changes.  NEUROLOGICAL: No weakness or paralysis. No lightheadedness or dizziness. No HA. No numbness or tingling.   PSYCHIATRIC: No depression.       OBJECTIVE:  Vital Signs Last 24 Hrs  T(C): 36.7 (15 Dec 2024 11:00), Max: 36.9 (15 Dec 2024 00:59)  T(F): 98.1 (15 Dec 2024 11:00), Max: 98.5 (15 Dec 2024 00:59)  HR: 79 (15 Dec 2024 11:00) (79 - 101)  BP: 143/92 (15 Dec 2024 11:00) (126/88 - 167/104)  BP(mean): --  RR: 18 (15 Dec 2024 11:00) (18 - 18)  SpO2: 99% (15 Dec 2024 11:00) (93% - 99%)    Parameters below as of 15 Dec 2024 11:00  Patient On (Oxygen Delivery Method): room air      I&O's Summary    14 Dec 2024 07:01  -  15 Dec 2024 07:00  --------------------------------------------------------  IN: 0 mL / OUT: 400 mL / NET: -400 mL    15 Dec 2024 07:01  -  15 Dec 2024 14:23  --------------------------------------------------------  IN: 180 mL / OUT: 550 mL / NET: -370 mL        MEDICATIONS  (STANDING):  aspirin enteric coated 81 milliGRAM(s) Oral daily  atorvastatin 40 milliGRAM(s) Oral at bedtime  clopidogrel Tablet 75 milliGRAM(s) Oral daily  dextrose 5%. 1000 milliLiter(s) (100 mL/Hr) IV Continuous <Continuous>  dextrose 5%. 1000 milliLiter(s) (50 mL/Hr) IV Continuous <Continuous>  dextrose 50% Injectable 25 Gram(s) IV Push once  dextrose 50% Injectable 12.5 Gram(s) IV Push once  dextrose 50% Injectable 25 Gram(s) IV Push once  enoxaparin Injectable 40 milliGRAM(s) SubCutaneous every 24 hours  folic acid 1 milliGRAM(s) Oral daily  glucagon  Injectable 1 milliGRAM(s) IntraMuscular once  insulin lispro (ADMELOG) corrective regimen sliding scale   SubCutaneous three times a day before meals  insulin lispro (ADMELOG) corrective regimen sliding scale   SubCutaneous at bedtime  losartan 100 milliGRAM(s) Oral daily  multivitamin 1 Tablet(s) Oral daily  nicotine - 21 mG/24Hr(s) Patch 1 Patch Transdermal daily  pantoprazole    Tablet 40 milliGRAM(s) Oral before breakfast  sodium chloride 0.9%. 1000 milliLiter(s) (100 mL/Hr) IV Continuous <Continuous>  thiamine 100 milliGRAM(s) Oral daily    MEDICATIONS  (PRN):  acetaminophen     Tablet .. 975 milliGRAM(s) Oral every 6 hours PRN Temp greater or equal to 38C (100.4F), Moderate Pain (4 - 6)  dextrose Oral Gel 15 Gram(s) Oral once PRN Blood Glucose LESS THAN 70 milliGRAM(s)/deciliter  traMADol 25 milliGRAM(s) Oral every 8 hours PRN moderate and severe pain    Allergies    No Known Allergies    Intolerances        CONSTITUTIONAL: No acute distress. Awake and alert.  ENT: C-collar in place.  RESPIRATORY: CTAB. No wheezes, rales, or rhonchi. No accessory muscle use. No apparent respiratory distress.  CARDIOVASCULAR: +S1/S2. No audible S3/S4. Regular rate and rhythm. No murmurs, rubs, or gallops. No LE swelling or edema.  GASTROINTESTINAL: Soft, nontender, nondistended. +BS. No rebound or guarding.   MUSCULOSKELETAL: Slow but voluntarily spontaneous movement in all extremities.  NEUROLOGICAL: CN 2-12 grossly intact. Sensation intact x 4EXT.   PSYCHIATRIC: Appropriate affect. A&Ox3 (oriented to person, place, and time).            12-15    136  |  101  |  13  ----------------------------<  159[H]  3.5   |  20[L]  |  0.74    Ca    9.4      15 Dec 2024 07:23  Mg     1.6     12-15      CAPILLARY BLOOD GLUCOSE      POCT Blood Glucose.: 111 mg/dL (15 Dec 2024 12:30)  POCT Blood Glucose.: 119 mg/dL (15 Dec 2024 08:16)  POCT Blood Glucose.: 131 mg/dL (14 Dec 2024 21:06)  POCT Blood Glucose.: 130 mg/dL (14 Dec 2024 16:48)      CARDIAC MARKERS ( 15 Dec 2024 07:23 )  x     / x     / x     / x     / 1.8 ng/mL  CARDIAC MARKERS ( 15 Dec 2024 02:30 )  x     / x     / x     / x     / 2.0 ng/mL      Urinalysis Basic - ( 15 Dec 2024 07:23 )    Color: x / Appearance: x / SG: x / pH: x  Gluc: 159 mg/dL / Ketone: x  / Bili: x / Urobili: x   Blood: x / Protein: x / Nitrite: x   Leuk Esterase: x / RBC: x / WBC x   Sq Epi: x / Non Sq Epi: x / Bacteria: x            RADIOLOGY AND ADDITIONAL TESTS:    CONSULTANT NOTES REVIEWED:    CARE DISCUSSED WITH THE FOLLOWING CONSULTANTS/PROVIDERS:

## 2024-12-15 NOTE — CONSULT NOTE ADULT - ASSESSMENT
Assessment and Recommendations:  64y male with a past medical history/ocular history of as above consulted for chronic vs. subacute left eye blurry vision, found to have cataract with 1+ inferior PEE OS    #Dry Eye OS  #Cataract OS  - BCVA 20/40 OD, 20/40 OS with readers   - IOP *** will return to check, PERRLA OU  - Will return for IOP check, no tonopen at this time  - Pt with no flashes, floaters, or curtain in vision  - No pain in eye, blurry vision intermittent for weeks, but more persisting over past 3 days since fall, however has since improved slightly  - Improves further with reading glasses 2.50  - Likely cataract OS causing chronic component, worsened by possibly new dry eye while in unit  - START artificial tears OS  - Pt can return to clinic after discharge PRN to assess for glasses improvement, no acute ophthalmologic intervention      Outpatient Follow-up: Patient should follow-up with his/her ophthalmologist or with [facility] Department of Ophthalmology within 1 week of after discharge at:    [address]  [place] [address]  [phone]    Dwayne Pyle MD, PGY-2  Contact: [employer]     Assessment and Recommendations:  64y male with a past medical history/ocular history of as above consulted for chronic vs. subacute left eye blurry vision, found to have cataract with 1+ inferior PEE OS    #Dry Eye OS  #Cataract OS  - BCVA 20/40 OD, 20/40 OS with readers   - IOP *** will return to check, PERRLA OU  - Will return for IOP check, no tonopen at this time  - Pt with no flashes, floaters, or curtain in vision  - No pain in eye, blurry vision intermittent for weeks, but more persisting over past 3 days since fall, however has since improved slightly  - Improves further with reading glasses 2.50  - Likely cataract OS causing chronic component, worsened by possibly new dry eye while in unit  - START artificial tears 4x a day both eyes  - Pt can return to clinic after discharge PRN to assess for glasses improvement, no acute ophthalmologic intervention      Outpatient Follow-up: Patient should follow-up with his/her ophthalmologist or with [facility] Department of Ophthalmology within 1 week of after discharge at:    [address]  [place] [address]  [phone]    Dwayne Pyle MD, PGY-2  Contact: [employer] Teams     Assessment and Recommendations:  64y male with a past medical history/ocular history of as above consulted for subacute left eye blurry vision, found to have cataract with 1+ inferior PEE OS    #Dry Eye OS  #Cataract OS  - BCVA 20/25 OD, 20/25 OS with 2.50 readers   - IOP 12, 15, PERRLA OU  - Will return for IOP check, no tonopen at this time ADDENDUM: IOP checked in morning and added above  - Pt with no flashes, floaters in vision, pt says there is shadow in vision centrally but can see through it  - No pain in eye, blurry vision intermittent for weeks, but really persisting over past 3 days since fall.  - Improves further to 20/25 in both eyes with reading glasses 2.50  - No detachment found on exam, no vitreous heme on DFE  - Likely cataract OS causing chronic component, worsened by possibly new dry eye while in unit  - START artificial tears 4x a day both eyes  - No acute ophthalmologic intervention  - Pt should come to clinic within 1 week after discharge for fundus photos and repeat DFE for change in vision      Outpatient Follow-up: Patient should follow-up with his/her ophthalmologist or with [facility] Department of Ophthalmology within 1 week of after discharge at:    [address]  [place] [address]  [phone]    Dwayne Pyle MD, PGY-2  Contact: [employer] Teams     Assessment and Recommendations:  64y male with a past medical history/ocular history of as above consulted for subacute left eye blurry vision, found to have cataract with 1+ inferior PEE OS    #Dry Eye OS  #Cataract OS  - BCVA 20/25 OD, 20/25 OS with 2.50 readers   - IOP 12, 15, PERRLA OU  - Will return for IOP check, no tonopen at this time ADDENDUM: IOP checked in morning and added above  - Pt with no flashes, floaters in vision, pt says there is shadow in vision centrally but can see through it  - CE/PCIOL OD last month with personal eye doctor,  - No pain in eye, blurry vision intermittent for weeks, but really persisting over past 3 days since fall.  - Improves further to 20/25 in both eyes with reading glasses 2.50  - No detachment found on exam at this time on DFE, no vitreous heme on DFE  - Likely cataract OS causing chronic component, worsened by possibly new dry eye while in unit  - START artificial tears 4x a day both eyes  - No acute ophthalmologic intervention at this time  - Pt should come to clinic within 1 week after discharge for fundus photos and repeat DFE for change in vision      Outpatient Follow-up: Patient should follow-up with his/her ophthalmologist or with [facility] Department of Ophthalmology within 1 week of after discharge at:    [address]  [place] [address]  [phone]    Dwayne Pyle MD, PGY-2  Contact: [employer] Teams     Assessment and Recommendations:  64y male with a past medical history/ocular history of as above consulted for subacute left eye blurry vision, found to have cataract with 1+ inferior PEE OS    #Dry Eye OS  #Cataract OS  - BCVA 20/25 OD, 20/25 OS with 2.50 readers   - IOP 12, 15, PERRLA OU  - Will return for IOP check, no tonopen at this time ADDENDUM: IOP checked in morning and added above  - Pt with no flashes, floaters in vision, pt says there is shadow in vision centrally but can see through it  - CE/PCIOL OD last month with personal eye doctor,  - No pain in eye, blurry vision intermittent for weeks, but really persisting over past 3 days since fall.  - 1-2+ PEE OS on anterior exam, 1+ PEE OD  - Vision improves to 20/25 in both eyes with reading glasses 2.50  - No detachment found on exam at this time on DFE, no vitreous heme on DFE  - Likely cataract OS causing chronic component, worsened by possibly new dry eye while in unit  - START artificial tears 4x a day both eyes  - No acute ophthalmologic intervention at this time  - Pt should come to clinic for outpatient follow up within 1 week after discharge for fundus photos and repeat DFE      Outpatient Follow-up: Patient should follow-up with his/her ophthalmologist or with [facility] Department of Ophthalmology within 1 week of after discharge at:    [address]  [place] [address] [phone]    Dwayne Pyle MD, PGY-2  Contact: [employer]

## 2024-12-15 NOTE — PROGRESS NOTE ADULT - PROBLEM SELECTOR PROBLEM 1
Vertebral compression fracture

## 2024-12-15 NOTE — PROGRESS NOTE ADULT - PROVIDER SPECIALTY LIST ADULT
Internal Medicine
Hospitalist
Hospitalist
Rehab Medicine
Rehab Medicine
Internal Medicine
Hospitalist

## 2024-12-15 NOTE — PROGRESS NOTE ADULT - PROBLEM SELECTOR PLAN 3
Hx of DM per patient, not on medications. Managed with diet per patient  - A1C 6.9  - c/w SSI and monitor FS ac and hs
- Hx of DM per patient, not on medications. Managed with diet per patient  - A1C 6.9  - c/w SSI   - Monitor FS (goal -180)
- Hx of DM per patient, not on medications. Managed with diet per patient  - A1C 6.9  - c/w SSI   - Monitor FS (goal -180)

## 2024-12-15 NOTE — CONSULT NOTE ADULT - SUBJECTIVE AND OBJECTIVE BOX
[facility] DEPARTMENT OF OPHTHALMOLOGY - INITIAL ADULT CONSULT  -----------------------------------------------------------------------------  Dwayne Pyle MD, PGY-2  Contact: TEAMS  -----------------------------------------------------------------------------    Pt seen saturday morning.     HPI:  64 year old male with hx of T2DM, CAD s/p CABG, HTN, HLD, transferred from [facility] for further evaluation after mechanical fall down stairs. The previous night, patient experienced a fall on stairs after slipping. Pt was wearing slippers and was turning around to close the door when he slipped. He fell down about 5-6 steps. He felt he may have passed out but is unsure. When he came to, he was at the bottom of the stairs and felt pain in his head, L arm, and L lateral chest.  Patient was evaluated at [facility] with CT imaging without any obvious traumatic injury.  Transferred for concern for spinal cord pathology due to left lower extremity numbness and weakness. He denies any incontinence, saddle anesthesia. Currently not endorsing any weakness or sensory changes. In the ED, patient was evaluated by trauma surgery and ortho-spine. MRI spine notable for edema in vertebral bodies of C7 and T1 concerning for compression fx. No evidence of spinal cord compression. Pt maintained in C collar. Pt was also noted to be tachycardic and  tremulous raising concern for alcohol withdrawal so was given valium in ED. Pt says he was not drinking around time of fall. BAL normal in ED. Pt admitted for further management.  (08 Dec 2024 01:55)    Interval History: Pt says visual changes reduced since prior.     PMH: As above  POcHx: denies surg/laser  FH: denies glc/amd  Social History: denies etoh/tobacco  Ophthalmic Medications: none  Allergies: NKDA    Review of Systems:  Constitutional: No fever, chills  Eyes: + blurry vision No flashes, floaters, FBS, erythema, discharge, double vision, OU  Neuro: No tremors  Cardiovascular: No chest pain, palpitations  Respiratory: No SOB, no cough  GI: No nausea, vomiting, abdominal pain  : No dysuria  Skin: no rash  Psych: no depression  Endocrine: no polyuria, polydipsia  Heme/lymph: no swelling    VITALS: T(C): 36.8 (12-15-24 @ 01:33)  T(F): 98.2 (12-15-24 @ 01:33), Max: 98.5 (12-15-24 @ 00:59)  HR: 100 (12-15-24 @ 01:33) (80 - 101)  BP: 152/100 (12-15-24 @ 01:33) (126/88 - 167/104)  RR:  (18 - 18)  SpO2:  (93% - 97%)  Wt(kg): --  General: AAO x 3, appropriate mood and affect    Ophthalmology Exam:  Visual acuity (cc 2.50): 20/40 OD ph +2, 20/40 OS ph +2  Pupils: PERRL OU, no RAPD  Ttono: *** Will return for pressure assessment  Extraocular movements (EOMs): Full OU, no pain, no diplopia  Confrontational Visual Field (CVF): Full OD, Full OS  Color Plates: 12/12 OD, 12/12 OS    Pen Light Exam (PLE)  External: Flat OU  Lids/Lashes/Lacrimal Ducts: Flat OU    Sclera/Conjunctiva: W+Q OU  Cornea: Tr PEE OD diffusely, 1+ inferior PEE OS  Anterior Chamber: D+F OU    Iris: Flat OU  Lens: 1+ inferior cortical and 1+ NS OD, 1-2+ NS OS OU    Fundus Exam: dilated with 1% tropicamide and 2.5% phenylephrine  Approval obtained from primary team for dilation  Patient aware that pupils can remained dilated for at least 4-6 hours  Exam performed with 20D lens    Vitreous: wnl OU  Disc, cup/disc: sharp and pink with PPA OU, 0.3 OU  Macula: Flat OU  Vessels: Mild attenuation  Periphery: flat OU   [facility] DEPARTMENT OF OPHTHALMOLOGY - INITIAL ADULT CONSULT  -----------------------------------------------------------------------------  Dwayne Pyle MD, PGY-2  Contact: TEAMS  -----------------------------------------------------------------------------    Pt seen saturday morning.    HPI:  64 year old male with hx of T2DM, CAD s/p CABG, HTN, HLD, transferred from [facility] for further evaluation after mechanical fall down stairs. The previous night, patient experienced a fall on stairs after slipping. Pt was wearing slippers and was turning around to close the door when he slipped. He fell down about 5-6 steps. He felt he may have passed out but is unsure. When he came to, he was at the bottom of the stairs and felt pain in his head, L arm, and L lateral chest.  Patient was evaluated at [facility] with CT imaging without any obvious traumatic injury.  Transferred for concern for spinal cord pathology due to left lower extremity numbness and weakness. He denies any incontinence, saddle anesthesia. Currently not endorsing any weakness or sensory changes. In the ED, patient was evaluated by trauma surgery and ortho-spine. MRI spine notable for edema in vertebral bodies of C7 and T1 concerning for compression fx. No evidence of spinal cord compression. Pt maintained in C collar. Pt was also noted to be tachycardic and  tremulous raising concern for alcohol withdrawal so was given valium in ED. Pt says he was not drinking around time of fall. BAL normal in ED. Pt admitted for further management.  (08 Dec 2024 01:55)    Interval History: Pt says visual changes reduced since prior.     PMH: As above  POcHx: CE/PCIOL OD, NPDR w/laser treatment unknown which  FH: denies glc/amd  Social History: denies etoh/tobacco  Ophthalmic Medications: none  Allergies: NKDA    Review of Systems:  Constitutional: No fever, chills  Eyes: + blurry vision No flashes, floaters, FBS, erythema, discharge, double vision, OU  Neuro: No tremors  Cardiovascular: No chest pain, palpitations  Respiratory: No SOB, no cough  GI: No nausea, vomiting, abdominal pain  : No dysuria  Skin: no rash  Psych: no depression  Endocrine: no polyuria, polydipsia  Heme/lymph: no swelling    VITALS: T(C): 36.8 (12-15-24 @ 01:33)  T(F): 98.2 (12-15-24 @ 01:33), Max: 98.5 (12-15-24 @ 00:59)  HR: 100 (12-15-24 @ 01:33) (80 - 101)  BP: 152/100 (12-15-24 @ 01:33) (126/88 - 167/104)  RR:  (18 - 18)  SpO2:  (93% - 97%)  Wt(kg): --  General: AAO x 3, appropriate mood and affect    Ophthalmology Exam:  Visual acuity (cc 2.50): 20/25 OD ph +2, 20/25 OS ph +2  Pupils: PERRL OU, no RAPD  Ttono: 12, 15  Extraocular movements (EOMs): Full OU, no pain, no diplopia  Confrontational Visual Field (CVF): Full OD, Full OS  Color Plates: 12/12 OD, 12/12 OS    Portable Slit Lamp Exam (PSLE)  External: Flat OU  Lids/Lashes/Lacrimal Ducts: Flat OU    Sclera/Conjunctiva: W+Q OU  Cornea: Tr PEE OD diffusely, 1+ inferior PEE OS  Anterior Chamber: D+F OU    Iris: Flat OU  Lens: PCIOL OD, 2+ NS, 1-2+ cortical, w/possible PSC OS    Fundus Exam: dilated with 1% tropicamide and 2.5% phenylephrine  Approval obtained from primary team for dilation  Patient aware that pupils can remained dilated for at least 4-6 hours  Exam performed with 20D lens    Vitreous: wnl OU  Disc, cup/disc: sharp and pink with PPA OU, 0.5 OD, 0.4 OS  Macula: Flat OU   Vessels: Mild attenuation  Periphery: flat OU with small DBH temporally OS   [facility] DEPARTMENT OF OPHTHALMOLOGY - INITIAL ADULT CONSULT  -----------------------------------------------------------------------------  Dwayne Pyle MD, PGY-2  Contact: TEAMS  -----------------------------------------------------------------------------    Pt seen saturday morning.    HPI:  64 year old male with hx of T2DM, CAD s/p CABG, HTN, HLD, transferred from [facility] for further evaluation after mechanical fall down stairs. The previous night, patient experienced a fall on stairs after slipping. Pt was wearing slippers and was turning around to close the door when he slipped. He fell down about 5-6 steps. He felt he may have passed out but is unsure. When he came to, he was at the bottom of the stairs and felt pain in his head, L arm, and L lateral chest.  Patient was evaluated at [facility] with CT imaging without any obvious traumatic injury.  Transferred for concern for spinal cord pathology due to left lower extremity numbness and weakness. He denies any incontinence, saddle anesthesia. Currently not endorsing any weakness or sensory changes. In the ED, patient was evaluated by trauma surgery and ortho-spine. MRI spine notable for edema in vertebral bodies of C7 and T1 concerning for compression fx. No evidence of spinal cord compression. Pt maintained in C collar. Pt was also noted to be tachycardic and  tremulous raising concern for alcohol withdrawal so was given valium in ED. Pt says he was not drinking around time of fall. BAL normal in ED. Pt admitted for further management.  (08 Dec 2024 01:55)    Interval History: Pt says visual changes reduced since prior but still persistant    PMH: As above  POcHx: CE/PCIOL OD, NPDR w/laser treatment unknown which  FH: denies glc/amd  Social History: denies etoh/tobacco  Ophthalmic Medications: none  Allergies: NKDA    Review of Systems:  Constitutional: No fever, chills  Eyes: + blurry vision No flashes, floaters, FBS, erythema, discharge, double vision, OU  Neuro: No tremors  Cardiovascular: No chest pain, palpitations  Respiratory: No SOB, no cough  GI: No nausea, vomiting, abdominal pain  : No dysuria  Skin: no rash  Psych: no depression  Endocrine: no polyuria, polydipsia  Heme/lymph: no swelling    VITALS: T(C): 36.8 (12-15-24 @ 01:33)  T(F): 98.2 (12-15-24 @ 01:33), Max: 98.5 (12-15-24 @ 00:59)  HR: 100 (12-15-24 @ 01:33) (80 - 101)  BP: 152/100 (12-15-24 @ 01:33) (126/88 - 167/104)  RR:  (18 - 18)  SpO2:  (93% - 97%)  Wt(kg): --  General: AAO x 3, appropriate mood and affect    Ophthalmology Exam:  Visual acuity (cc 2.50): 20/25 OD ph +2, 20/25 OS ph +2  Pupils: PERRL OU, no RAPD  Ttono: 12, 15  Extraocular movements (EOMs): Full OU, no pain, no diplopia  Confrontational Visual Field (CVF): Full OD, Full OS  Color Plates: 12/12 OD, 12/12 OS    Portable Slit Lamp Exam (PSLE)  External: Flat OU  Lids/Lashes/Lacrimal Ducts: Flat OU    Sclera/Conjunctiva: W+Q OU  Cornea: Tr PEE OD diffusely, 1+ inferior PEE OS  Anterior Chamber: D+F OU    Iris: Flat OU  Lens: PCIOL OD, 2+ NS, 1-2+ cortical, w/possible PSC OS    Fundus Exam: dilated with 1% tropicamide and 2.5% phenylephrine  Approval obtained from primary team for dilation  Patient aware that pupils can remained dilated for at least 4-6 hours  Exam performed with 20D lens    Vitreous: wnl OU  Disc, cup/disc: sharp and pink with PPA OU, 0.5 OD, 0.4 OS  Macula: Flat OU   Vessels: Mild attenuation  Periphery: flat OU with small DBH temporally OS   [facility] DEPARTMENT OF OPHTHALMOLOGY - INITIAL ADULT CONSULT  -----------------------------------------------------------------------------  Dwayne Pyle MD, PGY-2  Contact: TEAMS  -----------------------------------------------------------------------------    Pt seen early saturday morning.    HPI:  64 year old male with hx of T2DM, CAD s/p CABG, HTN, HLD, transferred from [facility] for further evaluation after mechanical fall down stairs. The previous night, patient experienced a fall on stairs after slipping. Pt was wearing slippers and was turning around to close the door when he slipped. He fell down about 5-6 steps. He felt he may have passed out but is unsure. When he came to, he was at the bottom of the stairs and felt pain in his head, L arm, and L lateral chest.  Patient was evaluated at [facility] with CT imaging without any obvious traumatic injury.  Transferred for concern for spinal cord pathology due to left lower extremity numbness and weakness. He denies any incontinence, saddle anesthesia. Currently not endorsing any weakness or sensory changes. In the ED, patient was evaluated by trauma surgery and ortho-spine. MRI spine notable for edema in vertebral bodies of C7 and T1 concerning for compression fx. No evidence of spinal cord compression. Pt maintained in C collar. Pt was also noted to be tachycardic and  tremulous raising concern for alcohol withdrawal so was given valium in ED. Pt says he was not drinking around time of fall. BAL normal in ED. Pt admitted for further management.  (08 Dec 2024 01:55)    Interval History: Pt says visual changes reduced since prior but still persistant    PMH: As above  POcHx: CE/PCIOL OD, NPDR w/laser treatment unknown which  FH: denies glc/amd  Social History: denies etoh/tobacco  Ophthalmic Medications: none  Allergies: NKDA    Review of Systems:  Constitutional: No fever, chills  Eyes: + blurry vision No flashes, floaters, FBS, erythema, discharge, double vision, OU  Neuro: No tremors  Cardiovascular: No chest pain, palpitations  Respiratory: No SOB, no cough  GI: No nausea, vomiting, abdominal pain  : No dysuria  Skin: no rash  Psych: no depression  Endocrine: no polyuria, polydipsia  Heme/lymph: no swelling    VITALS: T(C): 36.8 (12-15-24 @ 01:33)  T(F): 98.2 (12-15-24 @ 01:33), Max: 98.5 (12-15-24 @ 00:59)  HR: 100 (12-15-24 @ 01:33) (80 - 101)  BP: 152/100 (12-15-24 @ 01:33) (126/88 - 167/104)  RR:  (18 - 18)  SpO2:  (93% - 97%)  Wt(kg): --  General: AAO x 3, appropriate mood and affect    Ophthalmology Exam:  Visual acuity (cc 2.50): 20/25 OD ph +2, 20/25 OS ph +2  Pupils: PERRL OU, no RAPD  Ttono: 12, 15  Extraocular movements (EOMs): Full OU, no pain, no diplopia  Confrontational Visual Field (CVF): Full OD, Full OS  Color Plates: 12/12 OD, 12/12 OS    Portable Slit Lamp Exam (PSLE)  External: Flat OU  Lids/Lashes/Lacrimal Ducts: Flat OU    Sclera/Conjunctiva: W+Q OU  Cornea: Tr PEE OD diffusely, 1+ inferior PEE OS  Anterior Chamber: D+F OU    Iris: Flat OU  Lens: PCIOL OD, 2+ NS, 1-2+ cortical, w/possible PSC OS    Fundus Exam: dilated with 1% tropicamide and 2.5% phenylephrine  Approval obtained from primary team for dilation  Patient aware that pupils can remained dilated for at least 4-6 hours  Exam performed with 20D lens    Vitreous: wnl OU  Disc, cup/disc: sharp and pink with PPA OU, 0.5 OD, 0.4 OS  Macula: Flat OU   Vessels: Mild attenuation  Periphery: flat OU with small DBH temporally OS

## 2024-12-15 NOTE — PROGRESS NOTE ADULT - PROBLEM SELECTOR PROBLEM 7
Prophylactic measure
Medication management

## 2024-12-15 NOTE — PROGRESS NOTE ADULT - PROBLEM SELECTOR PLAN 6
Endorsing chest pain 12/12, no SOB or palpitations   - check EKG  - add on Adolfo  - pain control
Patient does not remember his medications and dosages. His wife is away and he has no one who can bring his medication list. Medication list currently incomplete - retrieved from [employer]  - c/w current meds
Patient does not remember his medications and dosages. His wife is away and he has no one who can bring his medication list. Medication list currently incomplete - retrieved from [employer]  - Will need full med rec in AM  - Call pharmacy: [facility] [phone] in AM for medication list
- Endorsing persistent chest pain; no SOB or palpitations   - Adolfo negative, low suspicion for cardiac disease, suspect likely MSK in setting of fall   - Repeat trops 12/15 negative  - Pain management evaluation    #L groin/LLE pain   - Had full trauma scans at OSH, including CT A/P which showed no acute pathology   - Defer further imaging   - Pain Management evaluation  - PT/OT recs [facility]
Endorsing chest pain 12/12, no SOB or palpitations   - Adolfo negative, low suspicion for cardiac disease, suspect likely MSK in setting of fall     #L groin/LLE pain   - had full trauma scans at OSH, including CT A/P which showed no acute pathology   - defer further imaging   - pain control with tylenol and tramadol prn   - PT/OT recs [facility]
Patient does not remember his medications and dosages. His wife is away and he has no one who can bring his medication list. Medication list currently incomplete - retrieved from [employer]  - c/w current meds
- Endorsing chest pain 12/12, no SOB or palpitations   - Adolfo negative, low suspicion for cardiac disease, suspect likely MSK in setting of fall     #L groin/LLE pain   - had full trauma scans at OSH, including CT A/P which showed no acute pathology   - defer further imaging   - pain control with tylenol and tramadol prn   - PT/OT recs [facility]

## 2024-12-15 NOTE — PROGRESS NOTE ADULT - PROBLEM SELECTOR PROBLEM 6
Chest pain
Chest pain
Medication management
Chest pain
Chest pain
Medication management
Medication management

## 2024-12-15 NOTE — PROGRESS NOTE ADULT - PROBLEM SELECTOR PLAN 1
Mechanical fall likely cause of injury. No evidence of cord compression on MRI. No alarm symptoms - no weakness, incontinence  - c/w cervical collar  - WBAT  - Tylenol prn for pain management  - PT evaluation  - Trauma and ortho recommendations appreciated - no acute surgical intervention  - MRI showing abnormal flow void in the right vertebral artery, cannot exclude vertebral artery dissection given fall and compression fractures in the area -  CTA head/neck pending to eval further   - Also with other incidental findings of possible 1.2 cm T1 hypointense focus in the superior endplate of T1 with associated T2 signal hyperintensity on STIR images; which may represent Schmorl's node; Recommended for nonemergent CT or MRI lumbar spine for further evaluation.
Mechanical fall likely cause of injury. No evidence of cord compression on MRI. No alarm symptoms - no weakness, incontinence  - c/w cervical collar  - WBAT  - Tylenol prn for pain management  - PT evaluation  - Trauma and ortho recommendations appreciated - no acute surgical intervention  - MRI showing abnormal flow void in the right vertebral artery, cannot exclude vertebral artery dissection given fall and compression fractures in the area -  CTA head/neck pending to eval further   - Also with other incidental findings of possible 1.2 cm T1 hypointense focus in the superior endplate of T1 with associated T2 signal hyperintensity on STIR images; which may represent Schmorl's node; Recommended for nonemergent CT or MRI lumbar spine for further evaluation.
Mechanical fall likely cause of injury. No evidence of cord compression on MRI. No alarm symptoms - no weakness, incontinence  - Maintain cervical collar  - WBAT  - Tylenol prn for pain management  - PT evaluation  - Trauma and ortho recommendations appreciated - no acute surgical intervention  - MRI showing abnormal flow void in the right vertebral artery, cannot exclude vertebral artery dissection given fall and compression fractures in the area -  CTA head/neck pending to eval further   - Also with other incidental findings of possible 1.2 cm T1 hypointense focus in the superior endplate of T1 with associated T2 signal hyperintensity on STIR images; which may represent Schmorl's node; Recommended for nonemergent CT or MRI lumbar spine for further evaluation.
Mechanical fall likely cause of injury. No evidence of cord compression on MRI. No alarm symptoms - no weakness, incontinence  - c/w cervical collar  - WBAT  - Tylenol prn for pain management  - PT evaluation  - Trauma and ortho recommendations appreciated - no acute surgical intervention  - MRI showing abnormal flow void in the right vertebral artery, cannot exclude vertebral artery dissection given fall and compression fractures in the area -  CTA head/neck pending to eval further   - Also with other incidental findings of possible 1.2 cm T1 hypointense focus in the superior endplate of T1 with associated T2 signal hyperintensity on STIR images; which may represent Schmorl's node; Recommended for nonemergent CT or MRI lumbar spine for further evaluation.
- Mechanical fall likely cause of injury. No evidence of cord compression on MRI. No alarm symptoms - no weakness, incontinence  - C/w cervical collar  - WBAT  - Tylenol prn for pain management  - PT evaluation  - Trauma and ortho recommendations appreciated - no acute surgical intervention  - MRI showing abnormal flow void in the right vertebral artery, cannot exclude vertebral artery dissection given fall and compression fractures in the area - CTA head/neck pending to eval further   - Also with other incidental findings of possible 1.2 cm T1 hypointense focus in the superior endplate of T1 with associated T2 signal hyperintensity on STIR images; which may represent Schmorl's node; Recommended for nonemergent CT or MRI lumbar spine for further evaluation.
- Mechanical fall likely cause of injury. No evidence of cord compression on MRI. No alarm symptoms - no weakness, incontinence  - MRI showing abnormal flow void in the right vertebral artery, cannot exclude vertebral artery dissection given fall and compression fractures in the area - CTA head/neck pending to eval further   - Also with other incidental findings of possible 1.2 cm T1 hypointense focus in the superior endplate of T1 with associated T2 signal hyperintensity on STIR images; which may represent Schmorl's node; Recommended for nonemergent CT or MRI lumbar spine for further evaluation  - Trauma and ortho recommendations appreciated - no acute surgical intervention  - C/w cervical collar  - WBAT  - Pain management given persistent left sided pain
Mechanical fall likely cause of injury. No evidence of cord compression on MRI. No alarm symptoms - no weakness, incontinence  - c/w cervical collar  - WBAT  - Tylenol prn for pain management  - PT evaluation  - Trauma and ortho recommendations appreciated - no acute surgical intervention  - MRI showing abnormal flow void in the right vertebral artery, cannot exclude vertebral artery dissection given fall and compression fractures in the area -  CTA head/neck pending to eval further   - Also with other incidental findings of possible 1.2 cm T1 hypointense focus in the superior endplate of T1 with associated T2 signal hyperintensity on STIR images; which may represent Schmorl's node; Recommended for nonemergent CT or MRI lumbar spine for further evaluation.

## 2024-12-16 ENCOUNTER — TRANSCRIPTION ENCOUNTER (OUTPATIENT)
Age: 64
End: 2024-12-16

## 2024-12-16 VITALS
RESPIRATION RATE: 18 BRPM | OXYGEN SATURATION: 98 % | TEMPERATURE: 99 F | DIASTOLIC BLOOD PRESSURE: 89 MMHG | HEART RATE: 71 BPM | SYSTOLIC BLOOD PRESSURE: 133 MMHG

## 2024-12-16 LAB — GLUCOSE BLDC GLUCOMTR-MCNC: 174 MG/DL — HIGH (ref 70–99)

## 2024-12-16 PROCEDURE — 83690 ASSAY OF LIPASE: CPT

## 2024-12-16 PROCEDURE — 86850 RBC ANTIBODY SCREEN: CPT

## 2024-12-16 PROCEDURE — 85018 HEMOGLOBIN: CPT

## 2024-12-16 PROCEDURE — 83036 HEMOGLOBIN GLYCOSYLATED A1C: CPT

## 2024-12-16 PROCEDURE — 82947 ASSAY GLUCOSE BLOOD QUANT: CPT

## 2024-12-16 PROCEDURE — 80048 BASIC METABOLIC PNL TOTAL CA: CPT

## 2024-12-16 PROCEDURE — 70551 MRI BRAIN STEM W/O DYE: CPT | Mod: MC

## 2024-12-16 PROCEDURE — 96374 THER/PROPH/DIAG INJ IV PUSH: CPT

## 2024-12-16 PROCEDURE — 85014 HEMATOCRIT: CPT

## 2024-12-16 PROCEDURE — 80307 DRUG TEST PRSMV CHEM ANLYZR: CPT

## 2024-12-16 PROCEDURE — 81001 URINALYSIS AUTO W/SCOPE: CPT

## 2024-12-16 PROCEDURE — 97530 THERAPEUTIC ACTIVITIES: CPT

## 2024-12-16 PROCEDURE — 85610 PROTHROMBIN TIME: CPT

## 2024-12-16 PROCEDURE — 99285 EMERGENCY DEPT VISIT HI MDM: CPT | Mod: 25

## 2024-12-16 PROCEDURE — 86901 BLOOD TYPING SEROLOGIC RH(D): CPT

## 2024-12-16 PROCEDURE — 82550 ASSAY OF CK (CPK): CPT

## 2024-12-16 PROCEDURE — 84484 ASSAY OF TROPONIN QUANT: CPT

## 2024-12-16 PROCEDURE — 85025 COMPLETE CBC W/AUTO DIFF WBC: CPT

## 2024-12-16 PROCEDURE — 93005 ELECTROCARDIOGRAM TRACING: CPT

## 2024-12-16 PROCEDURE — 36415 COLL VENOUS BLD VENIPUNCTURE: CPT

## 2024-12-16 PROCEDURE — 96375 TX/PRO/DX INJ NEW DRUG ADDON: CPT

## 2024-12-16 PROCEDURE — 70498 CT ANGIOGRAPHY NECK: CPT | Mod: MC

## 2024-12-16 PROCEDURE — 83735 ASSAY OF MAGNESIUM: CPT

## 2024-12-16 PROCEDURE — 84295 ASSAY OF SERUM SODIUM: CPT

## 2024-12-16 PROCEDURE — 84132 ASSAY OF SERUM POTASSIUM: CPT

## 2024-12-16 PROCEDURE — 92610 EVALUATE SWALLOWING FUNCTION: CPT

## 2024-12-16 PROCEDURE — 99239 HOSP IP/OBS DSCHRG MGMT >30: CPT

## 2024-12-16 PROCEDURE — 83605 ASSAY OF LACTIC ACID: CPT

## 2024-12-16 PROCEDURE — 97116 GAIT TRAINING THERAPY: CPT

## 2024-12-16 PROCEDURE — 76498 UNLISTED MR PROCEDURE: CPT | Mod: MC

## 2024-12-16 PROCEDURE — 82962 GLUCOSE BLOOD TEST: CPT

## 2024-12-16 PROCEDURE — 82435 ASSAY OF BLOOD CHLORIDE: CPT

## 2024-12-16 PROCEDURE — 97166 OT EVAL MOD COMPLEX 45 MIN: CPT

## 2024-12-16 PROCEDURE — 82803 BLOOD GASES ANY COMBINATION: CPT

## 2024-12-16 PROCEDURE — 84100 ASSAY OF PHOSPHORUS: CPT

## 2024-12-16 PROCEDURE — 82553 CREATINE MB FRACTION: CPT

## 2024-12-16 PROCEDURE — 85730 THROMBOPLASTIN TIME PARTIAL: CPT

## 2024-12-16 PROCEDURE — 70496 CT ANGIOGRAPHY HEAD: CPT | Mod: MC

## 2024-12-16 PROCEDURE — 86900 BLOOD TYPING SEROLOGIC ABO: CPT

## 2024-12-16 PROCEDURE — 80053 COMPREHEN METABOLIC PANEL: CPT

## 2024-12-16 PROCEDURE — 97161 PT EVAL LOW COMPLEX 20 MIN: CPT

## 2024-12-16 PROCEDURE — 82330 ASSAY OF CALCIUM: CPT

## 2024-12-16 RX ORDER — CLOPIDOGREL 75 MG/1
1 TABLET, FILM COATED ORAL
Refills: 0 | DISCHARGE

## 2024-12-16 RX ORDER — NICOTINE 21 MG/24H
0 PATCH, EXTENDED RELEASE TRANSDERMAL
Qty: 0 | Refills: 0 | DISCHARGE
Start: 2024-12-16

## 2024-12-16 RX ORDER — SPIRONOLACTONE 25 MG
1 TABLET ORAL
Refills: 0 | DISCHARGE

## 2024-12-16 RX ORDER — TRAMADOL HYDROCHLORIDE 300 MG/1
0.5 CAPSULE ORAL
Qty: 0 | Refills: 0 | DISCHARGE
Start: 2024-12-16

## 2024-12-16 RX ORDER — MIRTAZAPINE 15 MG/1
1 TABLET, FILM COATED ORAL
Refills: 0 | DISCHARGE

## 2024-12-16 RX ORDER — TRAMADOL HYDROCHLORIDE 300 MG/1
25 CAPSULE ORAL EVERY 8 HOURS
Refills: 0 | Status: DISCONTINUED | OUTPATIENT
Start: 2024-12-16 | End: 2024-12-16

## 2024-12-16 RX ORDER — LANOLIN ALCOHOL/MO/W.PET/CERES
1 CREAM (GRAM) TOPICAL
Qty: 0 | Refills: 0 | DISCHARGE
Start: 2024-12-16

## 2024-12-16 RX ORDER — POVIDONE, POLYVINYL ALCOHOL 20; 27 G/1000ML; G/1000ML
1 SOLUTION OPHTHALMIC
Qty: 0 | Refills: 0 | DISCHARGE
Start: 2024-12-16

## 2024-12-16 RX ORDER — LOSARTAN POTASSIUM 100 MG/1
1 TABLET, FILM COATED ORAL
Refills: 0 | DISCHARGE

## 2024-12-16 RX ORDER — PANTOPRAZOLE SODIUM 40 MG/1
1 TABLET, DELAYED RELEASE ORAL
Refills: 0 | DISCHARGE

## 2024-12-16 RX ORDER — ACETAMINOPHEN 500MG 500 MG/1
1000 TABLET, COATED ORAL EVERY 8 HOURS
Refills: 0 | Status: DISCONTINUED | OUTPATIENT
Start: 2024-12-16 | End: 2024-12-16

## 2024-12-16 RX ORDER — ACETAMINOPHEN 500MG 500 MG/1
2 TABLET, COATED ORAL
Qty: 0 | Refills: 0 | DISCHARGE
Start: 2024-12-16

## 2024-12-16 RX ORDER — GLUCOSAMINE SULFATE DIPOT CHLR 500 MG
1 CAPSULE ORAL
Qty: 0 | Refills: 0 | DISCHARGE
Start: 2024-12-16

## 2024-12-16 RX ORDER — CYANOCOBALAMIN/FOLIC AC/VIT B6 1-2.2-25MG
1 TABLET ORAL
Qty: 0 | Refills: 0 | DISCHARGE
Start: 2024-12-16

## 2024-12-16 RX ORDER — OXYCODONE HYDROCHLORIDE 30 MG/1
2.5 TABLET ORAL
Qty: 0 | Refills: 0 | DISCHARGE
Start: 2024-12-16

## 2024-12-16 RX ORDER — OXYCODONE HYDROCHLORIDE 30 MG/1
2.5 TABLET ORAL EVERY 6 HOURS
Refills: 0 | Status: DISCONTINUED | OUTPATIENT
Start: 2024-12-16 | End: 2024-12-16

## 2024-12-16 RX ADMIN — NICOTINE 1 PATCH: 21 PATCH, EXTENDED RELEASE TRANSDERMAL at 11:51

## 2024-12-16 RX ADMIN — Medication 1 TABLET(S): at 11:37

## 2024-12-16 RX ADMIN — Medication 1 MILLIGRAM(S): at 11:37

## 2024-12-16 RX ADMIN — NICOTINE 1 PATCH: 21 PATCH, EXTENDED RELEASE TRANSDERMAL at 11:44

## 2024-12-16 RX ADMIN — Medication 81 MILLIGRAM(S): at 11:37

## 2024-12-16 RX ADMIN — ENOXAPARIN SODIUM 40 MILLIGRAM(S): 30 INJECTION SUBCUTANEOUS at 05:23

## 2024-12-16 RX ADMIN — ACETAMINOPHEN 500MG 975 MILLIGRAM(S): 500 TABLET, COATED ORAL at 05:06

## 2024-12-16 RX ADMIN — ACETAMINOPHEN 500MG 1000 MILLIGRAM(S): 500 TABLET, COATED ORAL at 11:37

## 2024-12-16 RX ADMIN — PANTOPRAZOLE SODIUM 40 MILLIGRAM(S): 40 TABLET, DELAYED RELEASE ORAL at 05:23

## 2024-12-16 RX ADMIN — Medication 100 MILLIGRAM(S): at 11:38

## 2024-12-16 RX ADMIN — Medication 1: at 08:20

## 2024-12-16 RX ADMIN — CLOPIDOGREL 75 MILLIGRAM(S): 75 TABLET, FILM COATED ORAL at 11:38

## 2024-12-16 RX ADMIN — LOSARTAN POTASSIUM 100 MILLIGRAM(S): 100 TABLET, FILM COATED ORAL at 05:22

## 2024-12-16 RX ADMIN — POVIDONE, POLYVINYL ALCOHOL 1 DROP(S): 20; 27 SOLUTION OPHTHALMIC at 11:38

## 2024-12-16 RX ADMIN — NICOTINE 1 PATCH: 21 PATCH, EXTENDED RELEASE TRANSDERMAL at 07:30

## 2024-12-16 RX ADMIN — POVIDONE, POLYVINYL ALCOHOL 1 DROP(S): 20; 27 SOLUTION OPHTHALMIC at 05:23

## 2024-12-16 NOTE — PROVIDER CONTACT NOTE (OTHER) - ACTION/TREATMENT ORDERED:
will follow up
Administer Iv Tylenol in care pt has pain and reassess blood pressure.
Rustam Jimenez MD notified and aware. Bedside assessment performed by provider. Day RN informed during handoff report.
EKG, Cardiac Enzymes, Tylenol

## 2024-12-16 NOTE — DISCHARGE NOTE PROVIDER - ATTENDING DISCHARGE PHYSICAL EXAMINATION:
Pt seen and examined, endorsing ongoing L groin pain, L CASAREZ (ongoing since admission) - no CP currently, no abd pain, tolerating diet     PHYSICAL EXAM:  GENERAL: NAD, well-developed  HEAD:  Atraumatic, normocephalic  EYES: EOMI, conjunctiva and sclera clear  NECK: C collar in place   CHEST/LUNG: Clear to auscultation bilaterally; no wheezing or rales  HEART: Regular rate and rhythm; no murmurs, no LE edema   ABDOMEN: Soft, nontender, nondistended; bowel sounds present  EXTREMITIES:  2+ Peripheral Pulses, no clubbing, cyanosis  PSYCH: AAOx3, calm affect, not anxious  SKIN: No rashes or lesions    Pt stable for d/c to [facility]   c/w pain medications   outpt f/u with ortho and PMD

## 2024-12-16 NOTE — DISCHARGE NOTE NURSING/CASE MANAGEMENT/SOCIAL WORK - FINANCIAL ASSISTANCE
[facility] provides services at a reduced cost to those who are determined to be eligible through [facility]’s financial assistance program. Information regarding [facility]’s financial assistance program can be found by going to [email or web address] or by calling [phone].

## 2024-12-16 NOTE — PROVIDER CONTACT NOTE (OTHER) - REASON
Pt states that he feels pain from his head straight down to his groin area. Left eye blurry vision.
Pressure elevated: 170/105
Pt c/o chest pain and DBP > 100

## 2024-12-16 NOTE — DISCHARGE NOTE PROVIDER - NSDCCPCAREPLAN_GEN_ALL_CORE_FT
PRINCIPAL DISCHARGE DIAGNOSIS  Diagnosis: Alcohol withdrawal  Assessment and Plan of Treatment: You were noted with alcohol withdrawal while admitted, which was treated with ativan taper. Your symptoms resolved.   Please abstain from alcohol use.   Please follow up with PMD      SECONDARY DISCHARGE DIAGNOSES  Diagnosis: Cervical compression fracture  Assessment and Plan of Treatment: You had a fall which resulted in compresison fracture of your cervical spine - you were placed in a C-collar which must be maintained until output follow up with orthopedics.   Please follow up with orthopedics   Please follow up with your PMD  Please take pain medications as prescribed

## 2024-12-16 NOTE — DISCHARGE NOTE PROVIDER - NSDCFUADDAPPT_GEN_ALL_CORE_FT
APPTS ARE READY TO BE MADE: [X] YES    Best Family or Patient Contact (if needed):    Additional Information about above appointments (if needed):    1:   2:   3:     Other comments or requests:    APPTS ARE READY TO BE MADE: [X] YES    Best Family or Patient Contact (if needed):    Additional Information about above appointments (if needed):    1:   2:   3:     Other comments or requests:     Patient is being discharged to [facility]. Caregiver will arrange follow up.

## 2024-12-16 NOTE — DISCHARGE NOTE NURSING/CASE MANAGEMENT/SOCIAL WORK - PATIENT PORTAL LINK FT
You can access the FollowMyHealth Patient Portal offered by [facility] by registering at the following website: [email or web address]. By joining [facility]’s FollowMyHealth portal, you will also be able to view your health information using other applications (apps) compatible with our system.

## 2024-12-16 NOTE — DISCHARGE NOTE PROVIDER - NSDCMRMEDTOKEN_GEN_ALL_CORE_FT
acetaminophen 500 mg oral tablet: 2 tab(s) orally every 8 hours  aspirin 81 mg oral delayed release tablet: 1 tab(s) orally once a day  atorvastatin 40 mg oral tablet: 1 tab(s) orally once a day  clopidogrel 75 mg oral tablet: 1 tab(s) orally once a day  folic acid 1 mg oral tablet: 1 tab(s) orally once a day  losartan 100 mg oral tablet: 1 tab(s) orally once a day  mirtazapine 7.5 mg oral tablet: 1 tab(s) orally once a day (at bedtime)  Multiple Vitamins oral tablet: 1 tab(s) orally once a day  nicotine 21 mg/24 hr transdermal film, extended release: transdermal once a day  ocular lubricant preservative-free ophthalmic solution: 1 drop(s) to each affected eye 4 times a day  oxyCODONE: 2.5 milligram(s) orally every 6 hours as needed for  severe pain  pantoprazole 40 mg oral delayed release tablet: 1 tab(s) orally once a day  spironolactone 25 mg oral tablet: 1 tab(s) orally once a day  thiamine 100 mg oral tablet: 1 tab(s) orally once a day  traMADol 50 mg oral tablet: 0.5 tab(s) orally every 8 hours As needed Moderate Pain (4 - 6)

## 2024-12-16 NOTE — DISCHARGE NOTE PROVIDER - CARE PROVIDER_API CALL
Adan Chopra)  Spine Surgery  [address], [address]  [place] [address]  Phone: [phone]  Fax: [phone]  Follow Up Time: 2 weeks

## 2024-12-16 NOTE — DISCHARGE NOTE PROVIDER - HOSPITAL COURSE
HPI:  64 year old male with hx of T2DM, CAD s/p CABG, HTN, HLD, transferred from [facility] for further evaluation after mechanical fall down stairs. The previous night, patient experienced a fall on stairs after slipping. Pt was wearing slippers and was turning around to close the door when he slipped. He fell down about 5-6 steps. He felt he may have passed out but is unsure. When he came to, he was at the bottom of the stairs and felt pain in his head, L arm, and L lateral chest.  Patient was evaluated at [facility] with CT imaging without any obvious traumatic injury.  Transferred for concern for spinal cord pathology due to left lower extremity numbness and weakness. He denies any incontinence, saddle anesthesia. Currently not endorsing any weakness or sensory changes. In the ED, patient was evaluated by trauma surgery and ortho-spine. MRI spine notable for edema in vertebral bodies of C7 and T1 concerning for compression fx. No evidence of spinal cord compression. Pt maintained in C collar. Pt was also noted to be tachycardic and  tremulous raising concern for alcohol withdrawal so was given valium in ED. Pt says he was not drinking around time of fall. BAL normal in ED. Pt admitted for further management.  (08 Dec 2024 01:55)    Hospital Course:  Pt admitted with vertebral compression fracture, pain management and PT evaluation. Pt had C-collar placed and seen by orthopedics and trauma team. Pt course complicated by alcohol withdrawal and pt started on IV ativan taper which was completed 12/15 with improvement in mental status. Pt also had CTA head and neck with confirmed no vertebral artery dissection. Pts pain was optimized and PT evaluation recommended [facility], to which pt is agreeable.     Pt stable for discharge  to [facility]      Important Medication Changes and Reason:    Active or Pending Issues Requiring Follow-up:  f/u PMD    Advanced Directives:   [X] Full code  [ ] DNR  [ ] Hospice    Discharge Diagnoses:  Vertebral compression fracture  Alcohol use with withdrawal.  T2DM  HTN  CAD  Chest pain   Left groin pain

## 2025-03-24 NOTE — CONSULT NOTE ADULT - ASSESSMENT
Chronic, noted on previous EKG. Asymptomatic.  Referral for cardiology, overdue for follow up. Continue statin therapy. Work on lifestyle modifications.       Orders:    Donell Portillo MD, Cardiology, [facility]     64M s/p mechanical fall with tenderness throughout left side of body, without CT evidence of injury on panscan.    Recommendations:  -Stat MRI spine.  -Stat neurosurgery consult.  -Dispo pending results of MRI.    Discussed with Dr. Lanier.    ACS/Trauma Surgery  Please page [phone] for all questions.   64M s/p mechanical fall with tenderness throughout left side of body, without CT evidence of injury on panscan.    Recommendations:  -Stat MRI spine.  -Stat spine surgery consult.  -IF no acute traumatic finding on MRI, medicine admission for alcohol withdrawal.    Discussed with Dr. Lanier.    ACS/Trauma Surgery  Please page [phone] for all questions.   64M s/p fall with tenderness throughout left side of body, without CT evidence of injury on panscan.    Recommendations:  -Stat MRI spine.  -Stat spine surgery consult.  -IF no acute traumatic finding on MRI, medicine admission for alcohol withdrawal.    Discussed with Dr. Lanier.    ACS/Trauma Surgery  Please page [phone] for all questions.
